# Patient Record
Sex: MALE | Race: WHITE | NOT HISPANIC OR LATINO | Employment: OTHER | URBAN - METROPOLITAN AREA
[De-identification: names, ages, dates, MRNs, and addresses within clinical notes are randomized per-mention and may not be internally consistent; named-entity substitution may affect disease eponyms.]

---

## 2017-01-10 ENCOUNTER — OFFICE VISIT (OUTPATIENT)
Dept: ALLERGY | Facility: CLINIC | Age: 57
End: 2017-01-10
Payer: COMMERCIAL

## 2017-01-10 VITALS
SYSTOLIC BLOOD PRESSURE: 150 MMHG | DIASTOLIC BLOOD PRESSURE: 90 MMHG | WEIGHT: 183.8 LBS | OXYGEN SATURATION: 97 % | BODY MASS INDEX: 27.86 KG/M2 | HEIGHT: 68 IN | HEART RATE: 83 BPM

## 2017-01-10 DIAGNOSIS — T78.3XXA ANGIOEDEMA, INITIAL ENCOUNTER: ICD-10-CM

## 2017-01-10 DIAGNOSIS — L50.9 URTICARIA: Primary | ICD-10-CM

## 2017-01-10 PROCEDURE — 99203 OFFICE O/P NEW LOW 30 MIN: CPT | Performed by: ALLERGY & IMMUNOLOGY

## 2017-01-10 NOTE — PROGRESS NOTES
"Dear Sunshine Alvarez MD    Thank you for referring your patient Isacc Louis to the Allergy/Immunology Clinic. Isacc Louis was seen in the Allergy Clinic at [facility]. The following are my recommendations regarding his Urticaria and Angioedema    1. Hold all alcohol for now  2. Begin zyrtec 20mg twice daily x 1 month. If symptoms do not develop, can slowly re-introduce alcohol while remaining on twice daily antihistamines  3. If symptoms do not return while on high-dose antihistamines and alcohol has been re-introduced into the diet, will slowly wean antihistamines  4. Follow-up in 2-3 months      Isacc Louis is a 56 year old White male being seen today in consultation for hives and facial swelling. He states that these symptoms first began last summer when [place]. He woke up with \"knots\" on his forehead and it looked as though he had bumped his head. He also had lip swelling. Isacc did not have a rash at that time. His symptoms resolved by that afternoon without any medication. He has continued to have recurrent episodes of lip swelling, \"knots\" on his forehead and now accompanied by some additional facial swelling and welts on his body. The symptoms typically start between 3 and 6AM and resolve by the following afternoon or evening. On one occasion he tried taking claritin but he couldn't tell if it was helping or if the swelling was resolving on its own. Isacc has had reactions that started during the day and he states he can feel his lip actively swelling. He has never experienced throat tightness, difficulty swallowing, difficulty breathing, coughing, shortness of breath, nausea, abdominal pain, vomiting, dizziness, or lightheadedness along with these symptoms. This past weekend was the first time he noticed tongue swelling and he had the most significant lip swelling that he had experienced. Again, his symptoms resolved within 24 hours.    Isacc has not been " able to identify any particular trigger for his symptoms other than drinking beer. All of these episodes have occurred after he was drinking beer and on one occasion red wine. 2 weeks ago he took claritin before drinking beer and the following day he did not have any swelling. The day after that he did have some rash but no swelling or other symptoms. This past weekend he did not take the claritin and did have symptoms of rash and lip swelling. He typically drinks craft beers usually only on the weekends. This past weekend he did not drink beer and only had 2 glasses of red wine.    Isacc is not taking any anti-hypertensive medications and does not take OTC medications regularly. He does use ibuprofen as needed for an old back injury. He last took this a few weeks ago and the following day had lip swelling however he had also drank beer that night. He has never had this type of reaction to ibuprofen or aleve in the past.      Past Medical History   Diagnosis Date     CARDIOVASCULAR SCREENING; LDL GOAL LESS THAN 160 2013     NO ACTIVE PROBLEMS (aka NONE)      Family History   Problem Relation Age of Onset     C.A.D. Father       from an MI at 56     Past Surgical History   Procedure Laterality Date     No history of surgery       Ent surgery       Tonsillectomy     Colonoscopy with co2 insufflation N/A 2014     Procedure: COLONOSCOPY WITH CO2 INSUFFLATION;  Surgeon: Kalpana Chen MD;  Location:  OR       ENVIRONMENTAL HISTORY: [place]. The home is heated with a forced air. They does have central air conditioning. The patient's bedroom is furnished with hard axel in bedroom.  Pets inside the house include None. There is not history of cockroach or mice infestation. There is/are 0 smokers in the house.  The house does not have a damp basement.     SOCIAL HISTORY:   Isacc is employed as  . He has missed 0 days of school/work.  "He lives with his spouse and son.  His spouse works as a .    REVIEW OF SYSTEMS:  General: positive  for weight gain. negative for weight loss. negative for changes in sleep.   Eyes: negative for itching. negative for redness. negative for tearing/watering.  Ears: negative for fullness. negative for hearing loss. negative for dizziness.   Nose: negative for snoring.negative for changes in smell. negative for drainage.   Throat: negative for hoarseness. negative for sore throat. negative for trouble swallowing.   Lungs: negative for shortness of breath.negative for wheezing. negative for sputum production.   Cardiovascular: negative for chest pain. negative for swelling of ankles. negative for fast or irregular heartbeat.   Gastrointestinal: negative for nausea. negative for heartburn. negative for acid reflux.   Musculoskeletal: negative for joint pain. negative for joint stiffness. negative for joint swelling.   Neurologic: negative for seizures. negative for fainting. negative for weakness.   Psychiatric: negative for changes in mood. negative for anxiety.   Endocrine: negative for cold intolerance. negative for heat intolerance. negative for tremors.   Hematologic: negative for easy bruising. negative for easy bleeding.  Integumentary: positive  for rash. negative for scaling. negative for nail changes.       Current outpatient prescriptions:      Ibuprofen (IBU PO), Take  by mouth., Disp: , Rfl:   Immunization History   Administered Date(s) Administered     TDAP (ADACEL AGES 11-64) 11/12/2014     No Known Allergies      EXAM:   /98 mmHg  Pulse 83  Ht 1.727 m (5' 8\")  Wt 83.371 kg (183 lb 12.8 oz)  BMI 27.95 kg/m2  SpO2 97%  GENERAL APPEARANCE: alert, cooperative and not in distress  SKIN: no rashes, no lesions  HEAD: atraumatic, normocephalic  EYES: lids and lashes normal, conjunctivae and sclerae clear, pupils equal, round, reactive to light, EOM full and intact  ENT: no scars or " lesions, nasal exam showed no discharge, swelling or lesions noted, otoscopy showed external auditory canals clear, tympanic membranes normal, tongue midline and normal, soft palate, uvula, and tonsils normal  NECK: no asymmetry, masses, or scars, supple without significant adenopathy  LUNGS: unlabored respirations, no intercostal retractions or accessory muscle use, clear to auscultation without rales or wheezes  HEART: regular rate and rhythm without murmurs and normal S1 and S2  MUSCULOSKELETAL: no musculoskeletal defects are noted  NEURO: no focal deficits noted, mental status intact  PSYCH: does not appear depressed or anxious and short and long term memory appears intact    WORKUP: None    ASSESSMENT/PLAN:  Isacc Louis is a 56 year old male here for evaluation of recurrent episodes of rash and lip swelling. He does not currently have any symptoms but did bring pictures that are consistent with urticaria and angioedema. It is unclear what provoked his initial reaction but he has consistently noted symptoms after ingestion of alcohol. Chronic urticaria can be associated with angioedema in many cases and can be exacerbated by alcohol or NSAIDs. Isacc was counseled to avoid alcohol and NSAIDs over the next month and increase his antihistamine dose. If symptoms do not occur he may try re-introducing alcohol while continuing with high dose antihistamines. If symptoms do not return he may slowly begin to wean antihistamines. Should his symptoms return we discussed pre-medication with high dose antihistamines prior to drinking vs complete avoidance of alcohol.    1. Hold all alcohol for now  2. Begin zyrtec 20mg twice daily x 1 month. If symptoms do not develop, can slowly re-introduce alcohol while remaining on twice daily antihistamines  3. If symptoms do not return while on high-dose antihistamines and alcohol has been re-introduced into the diet, will slowly wean antihistamines  4. Follow-up in 2-3  francoise Chatman MD  Allergy/Immunology  [facility] and [place], [place]      Chart documentation done in part with Dragon Voice Recognition Software. Although reviewed after completion, some word and grammatical errors may remain.

## 2017-01-10 NOTE — MR AVS SNAPSHOT
After Visit Summary   1/10/2017    Isacc Louis    MRN: [id number]           Patient Information     Date Of Birth          1960        Visit Information        Provider Department      1/10/2017 4:00 PM Saulo Chatman MD [facility]        Care Instructions    If you have any questions regarding your allergies, asthma, or what we discussed during your visit today please call the allergy clinic or contact us via [employer].      [facility] Allergy: [phone]      1. Begin taking zyrtec (generic is cetirizine) 10mg tablets - 1 table twice daily x 1 month. During this time avoid drinking alcohol    2. If your symptoms have not returned, you may slowly re-introduce alcohol into your diet but continue taking the zyrtec twice daily    3. If you continue to remain symptom free, wean the zyrtec to 1 tablet daily x 1 month and then stop.    4. If symptoms return when drinking alcohol, pre-medicate with zyrtec. Take 2 tablets (20mg) at least an hour prior to drinking alcohol and repeat another 2 tablets the following day.        Follow-ups after your visit        Who to contact     If you have questions or need follow up information about today's clinic visit or your schedule please contact [facility] directly at [phone].  Normal or non-critical lab and imaging results will be communicated to you by [employer], letter or phone within 4 business days after the clinic has received the results. If you do not hear from us within 7 days, please contact the clinic through [employer] or phone. If you have a critical or abnormal lab result, we will notify you by phone as soon as possible.  Submit refill requests through [employer] or call your pharmacy and they will forward the refill request to us. Please allow 3 business days for your refill to be completed.          Additional Information About Your Visit        [employer] Information     [employer] lets you send messages to your  "doctor, view your test results, renew your prescriptions, schedule appointments and more. To sign up, go to [email or web address] . Click on \"Log in\" on the left side of the screen, which will take you to the Welcome page. Then click on \"Sign up Now\" on the right side of the page.     You will be asked to enter the access code listed below, as well as some personal information. Please follow the directions to create your username and password.     Your access code is: [email or web address]  Expires: 3/19/2017  5:19 PM     Your access code will  in 90 days. If you need help or a new code, please call your [facility] clinic or [phone].        Care EveryWhere ID     This is your Care EveryWhere ID. This could be used by other organizations to access your [facility] medical records  [id number]        Your Vitals Were     Pulse Height BMI (Body Mass Index) Pulse Oximetry          83 1.727 m (5' 8\") 27.95 kg/m2 97%         Blood Pressure from Last 3 Encounters:   01/10/17 143/98   16 128/96   16 122/80    Weight from Last 3 Encounters:   01/10/17 83.371 kg (183 lb 12.8 oz)   16 82.872 kg (182 lb 11.2 oz)   16 83.28 kg (183 lb 9.6 oz)              Today, you had the following     No orders found for display       Primary Care Provider Office Phone #    [facility] [place] [phone]       No address on file        Thank you!     Thank you for choosing [facility] [place]  for your care. Our goal is always to provide you with excellent care. Hearing back from our patients is one way we can continue to improve our services. Please take a few minutes to complete the written survey that you may receive in the mail after your visit with us. Thank you!             Your Updated Medication List - Protect others around you: Learn how to safely use, store and throw away your medicines at www.disposemymeds.org.          This list is accurate as of: 1/10/17  4:15 PM.  Always use your most recent " med list.                   Brand Name Dispense Instructions for use    IBU PO      Take  by mouth.

## 2017-01-10 NOTE — PATIENT INSTRUCTIONS
If you have any questions regarding your allergies, asthma, or what we discussed during your visit today please call the allergy clinic or contact us via [facility].      [facility]: [phone]      1. Begin taking zyrtec (generic is cetirizine) 10mg tablets - 2 tablets twice daily x 1 month. During this time avoid drinking alcohol    2. If your symptoms have not returned, you may slowly re-introduce alcohol into your diet but continue taking the zyrtec twice daily    3. If you continue to remain symptom free, wean the zyrtec to 1 tablet twice daily x 1 month then 1 tablet daily x 1 month and then stop.    4. If symptoms return when drinking alcohol, pre-medicate with zyrtec. Take 2 tablets (20mg) at least an hour prior to drinking alcohol and repeat another 2 tablets the following day.

## 2017-01-10 NOTE — NURSING NOTE
"Chief Complaint   Patient presents with     Consult     hives, lips and face swelling since last summer2016       Initial /98 mmHg  Pulse 83  Ht 1.727 m (5' 8\")  Wt 83.371 kg (183 lb 12.8 oz)  BMI 27.95 kg/m2  SpO2 97% Estimated body mass index is 27.95 kg/(m^2) as calculated from the following:    Height as of this encounter: 1.727 m (5' 8\").    Weight as of this encounter: 83.371 kg (183 lb 12.8 oz).  BP completed using cuff size: regular  Maile Aponte MA        "

## 2018-11-27 ENCOUNTER — OFFICE VISIT (OUTPATIENT)
Dept: FAMILY MEDICINE | Facility: CLINIC | Age: 58
End: 2018-11-27
Payer: COMMERCIAL

## 2018-11-27 VITALS
HEART RATE: 75 BPM | BODY MASS INDEX: 26.52 KG/M2 | OXYGEN SATURATION: 95 % | SYSTOLIC BLOOD PRESSURE: 132 MMHG | HEIGHT: 68 IN | TEMPERATURE: 97.8 F | RESPIRATION RATE: 18 BRPM | WEIGHT: 175 LBS | DIASTOLIC BLOOD PRESSURE: 90 MMHG

## 2018-11-27 DIAGNOSIS — R05.9 COUGH: Primary | ICD-10-CM

## 2018-11-27 PROCEDURE — 99213 OFFICE O/P EST LOW 20 MIN: CPT | Performed by: NURSE PRACTITIONER

## 2018-11-27 RX ORDER — CODEINE PHOSPHATE AND GUAIFENESIN 10; 100 MG/5ML; MG/5ML
1-2 SOLUTION ORAL 2 TIMES DAILY PRN
Qty: 120 ML | Refills: 0 | Status: SHIPPED | OUTPATIENT
Start: 2018-11-27 | End: 2020-07-31

## 2018-11-27 RX ORDER — DOXYCYCLINE 100 MG/1
100 CAPSULE ORAL 2 TIMES DAILY
Qty: 14 CAPSULE | Refills: 0 | Status: SHIPPED | OUTPATIENT
Start: 2018-11-27 | End: 2020-07-31

## 2018-11-27 ASSESSMENT — PAIN SCALES - GENERAL: PAINLEVEL: NO PAIN (0)

## 2018-11-27 NOTE — PROGRESS NOTES
"  SUBJECTIVE:   Isacc Louis is a 58 year old male who presents to clinic today for the following health issues:      Follow up Cough  -3 weeks ago went [place] hunting and climbed up a lot of hills  -Hard to take a deep breath  -when laying flat can hear lungs gurgling  -Body is tired    Went on [employer]uel for work, got a prescription. Didn't seem to help. Symptoms worsening as the day goes on. Was given tessalon pills, not much help. No antibiotic. That script was few a few weeks ago. Feels a little fever/chills.         Problem list and histories reviewed & adjusted, as indicated.  Additional history: as documented    Patient Active Problem List   Diagnosis     CARDIOVASCULAR SCREENING; LDL GOAL LESS THAN 160     NO ACTIVE PROBLEMS (aka NONE)     Onychomycosis     Sleep disturbances     Elevated blood pressure reading without diagnosis of hypertension     Past Surgical History:   Procedure Laterality Date     COLONOSCOPY WITH CO2 INSUFFLATION N/A 2014    Procedure: COLONOSCOPY WITH CO2 INSUFFLATION;  Surgeon: Kalpana Chen MD;  Location: [facility] OR     ENT SURGERY      Tonsillectomy     NO HISTORY OF SURGERY         Social History   Substance Use Topics     Smoking status: Never Smoker     Smokeless tobacco: Never Used     Alcohol use Yes      Comment: 4 beers over the weekend     Family History   Problem Relation Age of Onset     C.A.D. Father       from an MI at 56           Reviewed and updated as needed this visit by clinical staff  Tobacco  Allergies  Meds  Problems  Med Hx  Surg Hx  Fam Hx  Soc Hx        Reviewed and updated as needed this visit by Provider  Allergies  Meds  Problems         ROS:  Constitutional, HEENT-as above, cardiovascular, pulmonary, gi and gu systems are negative, except as otherwise noted.    OBJECTIVE:     /90 (BP Location: Right arm, Patient Position: Sitting, Cuff Size: Adult Regular)  Pulse 75  Temp 97.8  F (36.6  C) (Oral)  Resp 18  Ht 1.727 m (5' 8\") "  Wt 79.4 kg (175 lb)  SpO2 95%  BMI 26.61 kg/m2  Body mass index is 26.61 kg/(m^2).  GENERAL: healthy, alert and no acute distress  EYES: Eyes grossly normal to inspection, PERRL and conjunctivae and sclerae normal  HENT: ear canals and TM's normal, nose and mouth without ulcers or lesions  NECK: no adenopathy, no asymmetry, masses, or scars and thyroid normal to palpation  RESP: lungs clear to auscultation - no rales, rhonchi or wheezes, dry harsh cough noted  CV: regular rate and rhythm, normal S1 S2, no S3 or S4, no murmur, click or rub  MS: no gross musculoskeletal defects noted, no edema    Diagnostic Test Results:  none     ASSESSMENT/PLAN:       1. Cough  Trial antibiotic. Call if not improving in 4-5 days  - doxycycline hyclate (VIBRAMYCIN) 100 MG capsule; Take 1 capsule (100 mg) by mouth 2 times daily  Dispense: 14 capsule; Refill: 0  - guaiFENesin-codeine (ROBITUSSIN AC) 100-10 MG/5ML SOLN solution; Take 5-10 mLs by mouth 2 times daily as needed for cough  Dispense: 120 mL; Refill: 0    FUTURE APPOINTMENTS:       - Follow-up visit in prn    NIDA Barahona, NP-C  [facility]

## 2018-11-27 NOTE — MR AVS SNAPSHOT
"              After Visit Summary   2018    Isacc Louis    MRN: [id number]           Patient Information     Date Of Birth          1960        Visit Information        Provider Department      2018 4:40 PM Marnie Osborne NP [facility]        Today's Diagnoses     Cough    -  1       Follow-ups after your visit        Follow-up notes from your care team     Return in about 7 days (around 2018), or if symptoms worsen or fail to improve.      Who to contact     If you have questions or need follow up information about today's clinic visit or your schedule please contact [facility] directly at [phone].  Normal or non-critical lab and imaging results will be communicated to you by MyChart, letter or phone within 4 business days after the clinic has received the results. If you do not hear from us within 7 days, please contact the clinic through MyChart or phone. If you have a critical or abnormal lab result, we will notify you by phone as soon as possible.  Submit refill requests through [employer] or call your pharmacy and they will forward the refill request to us. Please allow 3 business days for your refill to be completed.          Additional Information About Your Visit        MyChart Information     [employer] lets you send messages to your doctor, view your test results, renew your prescriptions, schedule appointments and more. To sign up, go to [email or web address] . Click on \"Log in\" on the left side of the screen, which will take you to the Welcome page. Then click on \"Sign up Now\" on the right side of the page.     You will be asked to enter the access code listed below, as well as some personal information. Please follow the directions to create your username and password.     Your access code is: [email or web address]  Expires: 2019  5:35 PM     Your access code will  in 90 days. If you need help or a new code, please call your [facility] " "or [phone].        Care EveryWhere ID     This is your Care EveryWhere ID. This could be used by other organizations to access your [facility] medical records  [id number]        Your Vitals Were     Pulse Temperature Respirations Height Pulse Oximetry BMI (Body Mass Index)    75 97.8  F (36.6  C) (Oral) 18 1.727 m (5' 8\") 95% 26.61 kg/m2       Blood Pressure from Last 3 Encounters:   11/27/18 132/90   01/10/17 150/90   12/19/16 (!) 128/96    Weight from Last 3 Encounters:   11/27/18 79.4 kg (175 lb)   01/10/17 83.4 kg (183 lb 12.8 oz)   12/19/16 82.9 kg (182 lb 11.2 oz)              Today, you had the following     No orders found for display         Today's Medication Changes          These changes are accurate as of 11/27/18  5:35 PM.  If you have any questions, ask your nurse or doctor.               Start taking these medicines.        Dose/Directions    doxycycline hyclate 100 MG capsule   Commonly known as:  VIBRAMYCIN   Used for:  Cough   Started by:  Marnie Osborne NP        Dose:  100 mg   Take 1 capsule (100 mg) by mouth 2 times daily   Quantity:  14 capsule   Refills:  0       guaiFENesin-codeine 100-10 MG/5ML Soln solution   Commonly known as:  ROBITUSSIN AC   Used for:  Cough   Started by:  Marnie Osborne NP        Dose:  1-2 tsp.   Take 5-10 mLs by mouth 2 times daily as needed for cough   Quantity:  120 mL   Refills:  0            Where to get your medicines      These medications were sent to [id number] - [place] [id number] [place] [address]  [address] [place] [id number]     Phone:  [phone]     doxycycline hyclate 100 MG capsule         Some of these will need a paper prescription and others can be bought over the counter.  Ask your nurse if you have questions.     Bring a paper prescription for each of these medications     guaiFENesin-codeine 100-10 MG/5ML Soln solution                Primary Care Provider Office Phone # Fax #    [facility] [phone] " [phone]       [address] [facility] [address]        Equal Access to Services     YARELY TEJADA : Hadii aad ku hadsunday Soharish, waaxda luqadaha, qaybta kaalmada yeimy, peri stephaniein hayaatyron mccoyisaiah singh jenifer ramirez. [facility] [phone].    ATENCIÓN: Si habla español, tiene a armando disposición servicios gratuitos de asistencia lingüística. Llame al [phone].    We comply with applicable federal civil rights laws and [place] laws. We do not discriminate on the basis of race, color, national origin, age, disability, sex, sexual orientation, or gender identity.            Thank you!     Thank you for choosing [facility]  for your care. Our goal is always to provide you with excellent care. Hearing back from our patients is one way we can continue to improve our services. Please take a few minutes to complete the written survey that you may receive in the mail after your visit with us. Thank you!             Your Updated Medication List - Protect others around you: Learn how to safely use, store and throw away your medicines at www.disposemymeds.org.          This list is accurate as of 11/27/18  5:35 PM.  Always use your most recent med list.                   Brand Name Dispense Instructions for use Diagnosis    doxycycline hyclate 100 MG capsule    VIBRAMYCIN    14 capsule    Take 1 capsule (100 mg) by mouth 2 times daily    Cough       guaiFENesin-codeine 100-10 MG/5ML Soln solution    ROBITUSSIN AC    120 mL    Take 5-10 mLs by mouth 2 times daily as needed for cough    Cough

## 2019-11-15 ENCOUNTER — OFFICE VISIT (OUTPATIENT)
Dept: FAMILY MEDICINE | Facility: CLINIC | Age: 59
End: 2019-11-15
Payer: COMMERCIAL

## 2019-11-15 VITALS — DIASTOLIC BLOOD PRESSURE: 94 MMHG | SYSTOLIC BLOOD PRESSURE: 138 MMHG

## 2019-11-15 DIAGNOSIS — M54.12 CERVICAL RADICULOPATHY: ICD-10-CM

## 2019-11-15 DIAGNOSIS — M54.9 UPPER BACK PAIN ON RIGHT SIDE: ICD-10-CM

## 2019-11-15 DIAGNOSIS — S46.811A SUPRASPINATUS SPRAIN, RIGHT, INITIAL ENCOUNTER: Primary | ICD-10-CM

## 2019-11-15 DIAGNOSIS — R03.0 ELEVATED BLOOD PRESSURE READING WITHOUT DIAGNOSIS OF HYPERTENSION: ICD-10-CM

## 2019-11-15 PROCEDURE — 99214 OFFICE O/P EST MOD 30 MIN: CPT | Performed by: NURSE PRACTITIONER

## 2019-11-15 RX ORDER — CYCLOBENZAPRINE HCL 10 MG
10 TABLET ORAL 3 TIMES DAILY PRN
Qty: 30 TABLET | Refills: 1 | Status: SHIPPED | OUTPATIENT
Start: 2019-11-15 | End: 2020-07-31

## 2019-11-15 ASSESSMENT — MIFFLIN-ST. JEOR: SCORE: 1471.04

## 2019-11-15 ASSESSMENT — PAIN SCALES - GENERAL: PAINLEVEL: EXTREME PAIN (8)

## 2019-11-15 NOTE — PROGRESS NOTES
Subjective     Isacc Louis is a 59 year old male who presents to clinic today for the following health issues:    HPI   Neck Pain  Onset: 5 days ago     Description:   Location: neck   Radiation: into the left shoulder    Intensity: 8/10    Progression of Symptoms:  worsening and same    Accompanying Signs & Symptoms:  Burning, prickly sensation (paresthesias) in arm(s): no   Numbness in arm(s): YES-left  Weakness in arm(s):  YES-left  Fever: no   Headache: no   Nausea and/or vomiting: no     History:   Trauma: no   Previous neck pain: YES  Previous surgery or injections: no   Previous Imaging (MRI,X ray): no     Precipitating factors:   Does movement increase the pain:  no     Alleviating factors:  Nothing     Therapies Tried and outcome:  Heat pads, ice  And ibuprofen didn't help   Patient was deer hunting last week but was more active than usual.  He has pain left supraspinatus region that radiates to his fingers, has numbness/tingling/weakness of 4th and 5th fingers on the left. No falls or prior injuries, no fever or chills. Patient denies: saddle paresthesia, leg wkness, fever, wt loss, urinary symptoms, cp, shortness of breath, other red flags.       Patient Active Problem List   Diagnosis     CARDIOVASCULAR SCREENING; LDL GOAL LESS THAN 160     NO ACTIVE PROBLEMS (aka NONE)     Onychomycosis     Sleep disturbances     Elevated blood pressure reading without diagnosis of hypertension     Past Surgical History:   Procedure Laterality Date     COLONOSCOPY WITH CO2 INSUFFLATION N/A 12/19/2014    Procedure: COLONOSCOPY WITH CO2 INSUFFLATION;  Surgeon: Kalpana Chen MD;  Location: [facility]     ENT SURGERY      Tonsillectomy     NO HISTORY OF SURGERY         Social History     Tobacco Use     Smoking status: Never Smoker     Smokeless tobacco: Never Used   Substance Use Topics     Alcohol use: Yes     Comment: 4 beers over the weekend     Family History   Problem Relation Age of Onset     C.A.D. Father       "    from an MI at 56         Current Outpatient Medications   Medication Sig Dispense Refill     cyclobenzaprine (FLEXERIL) 10 MG tablet Take 1 tablet (10 mg) by mouth 3 times daily as needed for muscle spasms 30 tablet 1     doxycycline hyclate (VIBRAMYCIN) 100 MG capsule Take 1 capsule (100 mg) by mouth 2 times daily (Patient not taking: Reported on 11/15/2019) 14 capsule 0     guaiFENesin-codeine (ROBITUSSIN AC) 100-10 MG/5ML SOLN solution Take 5-10 mLs by mouth 2 times daily as needed for cough (Patient not taking: Reported on 11/15/2019) 120 mL 0     BP Readings from Last 3 Encounters:   11/15/19 (!) 138/94   18 132/90   01/10/17 150/90    Wt Readings from Last 3 Encounters:   11/15/19 (P) 66.9 kg (147 lb 6.4 oz)   18 79.4 kg (175 lb)   01/10/17 83.4 kg (183 lb 12.8 oz)         Reviewed and updated as needed this visit by Provider         Review of Systems   ROS COMP: Constitutional, HEENT, cardiovascular, pulmonary, gi and gu systems are negative, except as otherwise noted.      Objective    BP (!) 138/94 (BP Location: Left arm, Patient Position: Chair, Cuff Size: Adult Regular)   Pulse (P) 68   Temp (P) 98.3  F (36.8  C) (Oral)   Resp (P) 16   Ht (P) 1.74 m (5' 8.5\")   Wt (P) 66.9 kg (147 lb 6.4 oz)   SpO2 (P) 98%   BMI (P) 22.09 kg/m    Body mass index is 22.09 kg/m  (pended).  Physical Exam   GENERAL: healthy, alert and no distress  EYES: Eyes grossly normal to inspection, PERRL and conjunctivae and sclerae normal  HENT: ear canals and TM's normal, nose and mouth without ulcers or lesions  NECK: no adenopathy, no asymmetry, masses, or scars and thyroid normal to palpation  RESP: lungs clear to auscultation - no rales, rhonchi or wheezes  CV: regular rate and rhythm, normal S1 S2, no S3 or S4, no murmur, click or rub, no peripheral edema and peripheral pulses strong  ABDOMEN: soft, nontender, no hepatosplenomegaly, no masses and bowel sounds normal  MS:TTP along right supraspinatus, " upper trapezius, able to raise arms above shoulder level but with pain, no weakness, decreased sensation lateral forearm and 4th and 5th fingers on right, otherwise,  no gross musculoskeletal defects noted, no edema  SKIN: no suspicious lesions or rashes  NEURO: Normal strength and tone, sensory exam grossly normal, mentation intact, oriented times 3, cranial nerves 2-12 intact, DTR's normal and symmetric UE and gait normal including heel/toe/tandem walking  PSYCH: mentation appears normal, affect normal/bright  LYMPH: normal ant/post cervical, supraclavicular nodes    Diagnostic Test Results:  Labs reviewed in Epic  none         Assessment & Plan     1. Supraspinatus sprain, right, initial encounter  Referring to physical therapy, patient to begin taking 800 mg Ibuprofen and Flexeril TID for pain/muscle spasm, reviewed indications for return to clinic/ visit, follow back 4 weeks.  - cyclobenzaprine (FLEXERIL) 10 MG tablet; Take 1 tablet (10 mg) by mouth 3 times daily as needed for muscle spasms  Dispense: 30 tablet; Refill: 1  - [facility]; Future    2. Upper back pain on right side  Likely overuse injury, see above.  - [facility]; Future    3. Elevated blood pressure reading without diagnosis of hypertension  Patient currently in pain, recheck BP 4 weeks. Low salt diet, encouraged.    4.  Cervical radiculopathy  Referring to physical therapy, follow back if not improved            See Patient Instructions    Return in about 4 weeks (around 12/13/2019), or if symptoms worsen or fail to improve.    NIDA Braxton [facility]

## 2019-11-15 NOTE — PATIENT INSTRUCTIONS
Patient Education     Treating Strains and Sprains    Strains and sprains happen when muscles or other soft tissues near your bones stretch or tear. These injuries can cause bruising, swelling, and pain. To ease your discomfort and speed the healing of your strain or sprain, follow the tips below. Remember, a strain or sprain can take 6 to 8 weeks to heal.  Important Note: Do not give aspirin to children or teens without discussing it with your healthcare provider first.  Ice first, heat later    Use ice for the first 24 to 48 hours after injury. Ice helps prevent swelling and reduce pain. Ice the injury for no more than 20 minutes at a time and allow at least 20 minutes between icing sessions.    Apply heat after the first 72 hours, once the swelling has gone down. Heat relaxes muscles and increases blood flow. Soak the injured area in warm water or use a heating pad set on low for no more than 15 minutes at a time.  Wrap and elevate    Wrap an injured limb firmly with an elastic bandage. This provides support and helps prevent swelling. Don t wear an elastic bandage overnight. Watch for tingling, numbness, or increased pain. Remove the bandage immediately if any of these occurs.    Elevate the injured area to help reduce swelling and throbbing. It s best to raise an injured limb above the level of your heart.  Medicines    Over-the-counter medicines such as acetaminophen or ibuprofen can help reduce pain. Some also help reduce swelling.    Take medicine only as directed.    Rest the area even if medicines are controlling the pain.  Rest    Rest the injured area by not using it for 24 hours.    When you re ready, return slowly to your normal activities. Rest the injured area often.    Don t use or walk on an injured limb if it hurts.  Date Last Reviewed: 1/1/2018 2000-2018 [employer]. [address] [facility], [place] [address]. All rights reserved. This information is not intended as a substitute  for professional medical care. Always follow your healthcare professional's instructions.

## 2019-11-20 ENCOUNTER — TELEPHONE (OUTPATIENT)
Dept: FAMILY MEDICINE | Facility: CLINIC | Age: 59
End: 2019-11-20

## 2019-11-20 DIAGNOSIS — S46.812D STRAIN OF SUPRASPINATUS MUSCLE OR TENDON, LEFT, SUBSEQUENT ENCOUNTER: Primary | ICD-10-CM

## 2019-11-20 NOTE — TELEPHONE ENCOUNTER
Spoke with patient on the phone. He has been taking cyclobenzaprine (FLEXERIL) 10 MG tablet for the neck pain that was prescribed for him at the office visit on  11/15/19. He takes it as prescribed. It helps with the pain somewhat but not much. He experiences  sharp pain intermittently and intermittently dull pain throughout the day.  Laying in bed sleeping pain is at its least. After he starts moving around pain returns, pain radiates into  back neck,  into arms, arms tingly .    Pain at its worst is 8/10.     patient is also taking aleve Q 12 hours.     Patient is wondering if provider can prescribe him something else to help with the pain in his  neck?    Olamide Amaro RN    Patient uses [employer].

## 2019-11-20 NOTE — TELEPHONE ENCOUNTER
Reason for call:  Other   Patient called regarding (reason for call): call back  Additional comments: the muscle relaxer from last week isn't helping much  He would like to call about a plan B      Phone number to reach patient:  Home number on file [phone] (home)    Best Time:  any    Can we leave a detailed message on this number?  YES     Use [facility]

## 2019-11-21 RX ORDER — TRAMADOL HYDROCHLORIDE 50 MG/1
50 TABLET ORAL EVERY 6 HOURS PRN
Qty: 12 TABLET | Refills: 0 | Status: SHIPPED | OUTPATIENT
Start: 2019-11-21 | End: 2019-11-24

## 2019-11-22 ENCOUNTER — THERAPY VISIT (OUTPATIENT)
Dept: PHYSICAL THERAPY | Facility: CLINIC | Age: 59
End: 2019-11-22
Payer: COMMERCIAL

## 2019-11-22 DIAGNOSIS — M54.12 CERVICAL RADICULOPATHY: ICD-10-CM

## 2019-11-22 PROCEDURE — 97110 THERAPEUTIC EXERCISES: CPT | Mod: GP | Performed by: PHYSICAL THERAPIST

## 2019-11-22 PROCEDURE — 97161 PT EVAL LOW COMPLEX 20 MIN: CPT | Mod: GP | Performed by: PHYSICAL THERAPIST

## 2019-11-22 NOTE — TELEPHONE ENCOUNTER
Spoke with patient about his pain which continues despite Ibuprofen 800 mg every 8 hours and  Flexeril 10 mg TID.  Pain radiates from upper left shoulder to elbow and he has tingling in 4th and 5th fingers.  Advised him to schedule back and we'll get X ray.  For now, gave #12 Tramadol for pain and he can continue on Ibuprofen and Flexeril for now.  Cloleen ALVA, CNP

## 2019-11-22 NOTE — PROGRESS NOTES
"[facility] Initial Evaluation -- Cervical    Evaluation Date: November 22, 2019  Isacc Louis is a 59 year old male with a cervica/shoulder condition.   Referral: primary care  Work mechanical stresses: sitting   Employment status:   Leisure mechanical stresses: n/a  Functional disability score (NDI):  See JUDE  VAS score (0-10): 3-8/10  Patient goals/expectations:  Decrease pain    HISTORY:    Present symptoms:  L neck/upper back pain, pain into L upper arm to elbow, L arm weakness.  Pain quality (sharp/shooting/stabbing/aching/burning/cramping):   Sharp, achey.  Paresthesia (yes/no):  Tingling in 4th/5th most, others as well    Present since (onset date): two weeks (early Nov 2019).     Symptoms (improving/unchanging/worsening):  unchanged.    Symptoms commenced as a result of: not sure, but was in tree stand bow hunting for a week just prior   Condition occurred in the following environment:  outdoors    Symptoms at onset (neck/arm/forearm/headache): L neck  Constant symptoms (neck/arm/forearm/headache): L neck/upper arm  Intermittent symptoms (neck/arm/forearm/headache): tingling    Symptoms are made worse with the following: Sometimes Sitting and time of day - Always AM   Symptoms are made better with the following: pain meds    Disturbed sleep (yes/no): 2-3 hrs lost per night  Number of pillows: 1  Sleeping postures (prone/sup/side R/L):  Either side    Previous episodes (0/1-5/6-10/11+): none Year of first episode: n/a    Previous history: describes \"pinched nerve\" that resolves on its own after a few days (several times)  Previous treatments: none    Specific Questions: (as reported by the patient)  Dizziness/Tinnitus/Nausea/Swallowing (pos/neg): neg  Gait/Upper Limbs (normal/abnormal): normal  Medications (nil/NSAIDS/anlag/steroids/anticoag/other):  Narcotics/Opiods and Muscle relaxants  Medical allergies:  none  General health (excellent/good/fair/poor):  " excellent  Pertinent medical history:  None  Imaging (None/Xray/MRI/Other):  none  Recent or major surgery (yes/no): no  Night pain (yes/no): no  Accidents (yes/no): none  Unexplained weight loss (yes/no): no  Barriers at home: none  Other red flags: none      HPI                    Objective:  System              Cervical/Thoracic Evaluation      Cervical Myotomes:          C5 (Deltoid):  Left: 5    Right: 5  C6 (Biceps):  Left: 4+    Right: 5  C7 (Triceps):  Left: 4+    Right: 5  C8 (Thumb Ext): Left: 3+    Right: 5  T1 (Intrinsics): Left: 3+    Right: 5  DTR's:  not assessed                                                              Soso Cervical Evaluation    Posture:  Sitting: fair            Movement Loss:    Flexion (Flex): nil  Retraction (RET): min  Extension (EXT): min  Lateral Flexion Right (LF R): nil  Lateral Flexion Left (LF L): mod and pain    Rotation Left (ROT L): min and pain  Test Movements:      RET: During: no effect  After: no effect  Mechanical Response: no effect  Repeat RET: During: increases  After: no worse  Mechanical Response: IncROM      RET (Lying): During: no effect  After: no effect  Mechanical Response: no effect  Repeat RET (Lying): During: decreases  After: better  Mechanical Response: IncROM                    Conclusion: derangement  Principle of Treatment:  Posture Correction: lumbar support    Extension: supine retraction 10-15 reps every 2-3 hrs                                             ROS    Assessment/Plan:    Patient is a 59 year old male with cervical complaints.    Patient has the following significant findings with corresponding treatment plan.                Diagnosis 1:  L cervical above elbow derangement  Pain -  manual therapy, self management, education, directional preference exercise and home program  Decreased ROM/flexibility - manual therapy, therapeutic exercise, therapeutic activity and home program  Decreased strength - therapeutic exercise,  therapeutic activities and home program  Inflammation - self management/home program  Impaired muscle performance - neuro re-education and home program  Decreased function - therapeutic activities and home program  Impaired posture - neuro re-education, therapeutic activities and home program    Cumulative Therapy Evaluation is: Low complexity.    Previous and current functional limitations:  (See Goal Flow Sheet for this information)    Short term and Long term goals: (See Goal Flow Sheet for this information)     Communication ability:  Patient appears to be able to clearly communicate and understand verbal and written communication and follow directions correctly.  Treatment Explanation - The following has been discussed with the patient:   RX ordered/plan of care  Anticipated outcomes  Possible risks and side effects  This patient would benefit from PT intervention to resume normal activities.   Rehab potential is excellent.    Frequency:  2 X week, once daily  Duration:  for 4 weeks  Discharge Plan:  Achieve all LTG.  Independent in home treatment program.  Reach maximal therapeutic benefit.    Please refer to the daily flowsheet for treatment today, total treatment time and time spent performing 1:1 timed codes.

## 2019-11-26 ENCOUNTER — THERAPY VISIT (OUTPATIENT)
Dept: PHYSICAL THERAPY | Facility: CLINIC | Age: 59
End: 2019-11-26
Payer: COMMERCIAL

## 2019-11-26 DIAGNOSIS — M54.12 CERVICAL RADICULOPATHY: ICD-10-CM

## 2019-11-26 PROCEDURE — 97530 THERAPEUTIC ACTIVITIES: CPT | Mod: GP | Performed by: PHYSICAL THERAPIST

## 2019-11-26 PROCEDURE — 97110 THERAPEUTIC EXERCISES: CPT | Mod: GP | Performed by: PHYSICAL THERAPIST

## 2019-12-03 ENCOUNTER — THERAPY VISIT (OUTPATIENT)
Dept: PHYSICAL THERAPY | Facility: CLINIC | Age: 59
End: 2019-12-03
Payer: COMMERCIAL

## 2019-12-03 DIAGNOSIS — M54.12 CERVICAL RADICULOPATHY: ICD-10-CM

## 2019-12-03 PROCEDURE — 97110 THERAPEUTIC EXERCISES: CPT | Mod: GP | Performed by: PHYSICAL THERAPIST

## 2019-12-03 PROCEDURE — 97530 THERAPEUTIC ACTIVITIES: CPT | Mod: GP | Performed by: PHYSICAL THERAPIST

## 2019-12-03 NOTE — PROGRESS NOTES
Subjective:  HPI                    Objective:  System    Physical Exam    General     ROS    Assessment/Plan:    SUBJECTIVE  Subjective changes as noted by pt:  Edward reports that over the weekend he was feeling good. Times where he felt no pain. Arm was still weak and numnbess in fingers. Last two days it's back again but not as bad as initially. Performing supine chin tucks. Overall 40% improved (would have said 60% this weekend).       Current pain level: 2/10     Changes in function:  Yes (See Goal flowsheet attached for changes in current functional level)     Adverse reaction to treatment or activity:  None    OBJECTIVE  Changes in objective findings:  Yes, See physical exam section and/or daily flowsheet for response to repeated movements.             ASSESSMENT  Isacc continues to require intervention to meet STG and LTG's: PT  Patient is progressing as expected.  Response to therapy has shown an improvement in  pain level and function  Progress made towards STG/LTG?  Yes (See Goal flowsheet attached for updates on achievement of STG and LTG)    PLAN  Continue current treatment plan until patient demonstrates readiness to progress to higher level exercises.    PTA/ATC plan:  N/A    Please refer to the daily flowsheet for treatment today, total treatment time and time spent performing 1:1 timed codes.

## 2019-12-06 ENCOUNTER — THERAPY VISIT (OUTPATIENT)
Dept: PHYSICAL THERAPY | Facility: CLINIC | Age: 59
End: 2019-12-06
Payer: COMMERCIAL

## 2019-12-06 DIAGNOSIS — M54.12 CERVICAL RADICULOPATHY: ICD-10-CM

## 2019-12-06 PROCEDURE — 97110 THERAPEUTIC EXERCISES: CPT | Mod: GP | Performed by: PHYSICAL THERAPIST

## 2019-12-06 PROCEDURE — 97530 THERAPEUTIC ACTIVITIES: CPT | Mod: GP | Performed by: PHYSICAL THERAPIST

## 2019-12-08 ENCOUNTER — MYC MEDICAL ADVICE (OUTPATIENT)
Dept: FAMILY MEDICINE | Facility: CLINIC | Age: 59
End: 2019-12-08

## 2019-12-09 NOTE — TELEPHONE ENCOUNTER
Sent evisit needed My Chart message sent.  Violeta Mendoza MA  [facility]  2nd Floor  Primary Care

## 2019-12-10 ENCOUNTER — THERAPY VISIT (OUTPATIENT)
Dept: PHYSICAL THERAPY | Facility: CLINIC | Age: 59
End: 2019-12-10
Payer: COMMERCIAL

## 2019-12-10 DIAGNOSIS — M54.12 CERVICAL RADICULOPATHY: ICD-10-CM

## 2019-12-10 PROCEDURE — 97110 THERAPEUTIC EXERCISES: CPT | Mod: GP | Performed by: PHYSICAL THERAPIST

## 2019-12-10 NOTE — PROGRESS NOTES
Subjective:  HPI                    Objective:  System              Cervical/Thoracic Evaluation      Cervical Myotomes:          C5 (Deltoid):  Left: 5    Right: 5  C6 (Biceps):  Left: 5    Right: 5  C7 (Triceps):  Left: 5    Right: 5  C8 (Thumb Ext): Left: 3    Right: 5  T1 (Intrinsics): Left: 3    Right: 5      Cervical Dermatomes:                  C8 left:  Hypo-light touch                                                           General     ROS    Assessment/Plan:    SUBJECTIVE  Subjective changes as noted by pt:  Edward reports that he's performing the L cervical sidebending followed by cervical retraction 15 reps every 2 hrs. Overall better. Still in pain, but it doesn't last as long. Better in morning. Yesterday was really good.        Current pain level: 1/10     Changes in function:  Yes (See Goal flowsheet attached for changes in current functional level)     Adverse reaction to treatment or activity:  None    OBJECTIVE  Changes in objective findings:  Yes, See physical exam section and/or daily flowsheet for response to repeated movements.           ASSESSMENT  Isacc continues to require intervention to meet STG and LTG's: PT  Patient's symptoms are resolving.  Patient is progressing as expected.  Response to therapy has shown an improvement in  pain level, radicular symptoms, ROM  and function  Progress made towards STG/LTG?  Yes (See Goal flowsheet attached for updates on achievement of STG and LTG)    PLAN  Continue current treatment plan until patient demonstrates readiness to progress to higher level exercises.    PTA/ATC plan:  N/A    Please refer to the daily flowsheet for treatment today, total treatment time and time spent performing 1:1 timed codes.

## 2019-12-11 ENCOUNTER — MYC MEDICAL ADVICE (OUTPATIENT)
Dept: FAMILY MEDICINE | Facility: CLINIC | Age: 59
End: 2019-12-11

## 2019-12-13 ENCOUNTER — E-VISIT (OUTPATIENT)
Dept: FAMILY MEDICINE | Facility: CLINIC | Age: 59
End: 2019-12-13

## 2019-12-13 DIAGNOSIS — M54.12 CERVICAL RADICULOPATHY: Primary | ICD-10-CM

## 2019-12-13 PROCEDURE — 99444 ZZC PHYSICIAN ONLINE EVALUATION & MANAGEMENT SERVICE: CPT | Performed by: NURSE PRACTITIONER

## 2019-12-17 ENCOUNTER — THERAPY VISIT (OUTPATIENT)
Dept: PHYSICAL THERAPY | Facility: CLINIC | Age: 59
End: 2019-12-17
Payer: COMMERCIAL

## 2019-12-17 DIAGNOSIS — M54.12 CERVICAL RADICULOPATHY: ICD-10-CM

## 2019-12-17 PROCEDURE — 97530 THERAPEUTIC ACTIVITIES: CPT | Mod: GP | Performed by: PHYSICAL THERAPIST

## 2019-12-17 PROCEDURE — 97110 THERAPEUTIC EXERCISES: CPT | Mod: GP | Performed by: PHYSICAL THERAPIST

## 2019-12-17 NOTE — PROGRESS NOTES
Subjective:  HPI                    Objective:  System    Physical Exam    General     ROS    Assessment/Plan:    SUBJECTIVE  Subjective changes as noted by pt:  Switched to Alleve from the Ibuprofen and over the weekend feels it has been better. Performing the seated sidebending every couple hours. Overall 70% to where he wants to be.         Current pain level: 1/10     Changes in function:  Yes (See Goal flowsheet attached for changes in current functional level)     Adverse reaction to treatment or activity:  None    OBJECTIVE  Changes in objective findings:  Yes, See physical exam section and/or daily flowsheet for response to repeated movements.           ASSESSMENT  Isacc continues to require intervention to meet STG and LTG's: PT  Patient's symptoms are resolving.  Patient is progressing as expected.  Response to therapy has shown an improvement in  pain level, radicular symptoms and ROM   Progress made towards STG/LTG?  Yes (See Goal flowsheet attached for updates on achievement of STG and LTG)    PLAN  Continue current treatment plan until patient demonstrates readiness to progress to higher level exercises.    PTA/ATC plan:  N/A    Please refer to the daily flowsheet for treatment today, total treatment time and time spent performing 1:1 timed codes.

## 2019-12-23 ENCOUNTER — THERAPY VISIT (OUTPATIENT)
Dept: PHYSICAL THERAPY | Facility: CLINIC | Age: 59
End: 2019-12-23
Payer: COMMERCIAL

## 2019-12-23 DIAGNOSIS — M54.12 CERVICAL RADICULOPATHY: ICD-10-CM

## 2019-12-23 PROCEDURE — 97110 THERAPEUTIC EXERCISES: CPT | Mod: GP | Performed by: PHYSICAL THERAPIST

## 2019-12-23 PROCEDURE — 97530 THERAPEUTIC ACTIVITIES: CPT | Mod: GP | Performed by: PHYSICAL THERAPIST

## 2019-12-23 NOTE — PROGRESS NOTES
Subjective:  HPI                    Objective:  System    Physical Exam    General     ROS    Assessment/Plan:    SUBJECTIVE  Subjective changes as noted by pt:  Doing better. Taking more Alleve per MD and that helps. Performing the chin tucks with overpressure every couple hours. The numnbess in his fingers is now tingling.         Current pain level: 1/10     Changes in function:  Yes (See Goal flowsheet attached for changes in current functional level)     Adverse reaction to treatment or activity:  None    OBJECTIVE  Changes in objective findings:  Yes, See physical exam section and/or daily flowsheet for response to repeated movements.           ASSESSMENT  Isacc continues to require intervention to meet STG and LTG's: PT  Patient is progressing as expected.  Response to therapy has shown an improvement in  pain level and radicular symptoms  Progress made towards STG/LTG?  Yes (See Goal flowsheet attached for updates on achievement of STG and LTG)    PLAN  Current treatment program is being advanced to more complex exercises.    PTA/ATC plan:  N/A    Please refer to the daily flowsheet for treatment today, total treatment time and time spent performing 1:1 timed codes.

## 2019-12-26 RX ORDER — CYCLOBENZAPRINE HCL 10 MG
10 TABLET ORAL
Qty: 30 TABLET | Refills: 1 | Status: SHIPPED | OUTPATIENT
Start: 2019-12-26 | End: 2020-07-31

## 2019-12-31 ENCOUNTER — THERAPY VISIT (OUTPATIENT)
Dept: PHYSICAL THERAPY | Facility: CLINIC | Age: 59
End: 2019-12-31
Payer: COMMERCIAL

## 2019-12-31 DIAGNOSIS — M54.12 CERVICAL RADICULOPATHY: ICD-10-CM

## 2019-12-31 PROCEDURE — 97110 THERAPEUTIC EXERCISES: CPT | Mod: GP | Performed by: PHYSICAL THERAPIST

## 2019-12-31 NOTE — PROGRESS NOTES
Subjective:  HPI                    Objective:  System    Physical Exam    General     ROS    Assessment/Plan:    SUBJECTIVE  Subjective changes as noted by pt:  Edward reports that he has improved since last week. L elbow pain persists, no shoulder pain. Continues to note tingling and numbness in L hand. Overall feels about 80% better in regard to pain.        Current pain level: 1-2/10     Changes in function:  Yes (See Goal flowsheet attached for changes in current functional level)     Adverse reaction to treatment or activity:  None    OBJECTIVE  Changes in objective findings:  Yes, See physical exam section and/or daily flowsheet for response to repeated movements.           ASSESSMENT  Isacc continues to require intervention to meet STG and LTG's: PT  Patient's symptoms are resolving.  Patient is progressing as expected.  Response to therapy has shown an improvement in  pain level, ROM  and function  Progress made towards STG/LTG?  Yes (See Goal flowsheet attached for updates on achievement of STG and LTG)    PLAN  Continue current treatment plan until patient demonstrates readiness to progress to higher level exercises.    PTA/ATC plan:  N/A    Please refer to the daily flowsheet for treatment today, total treatment time and time spent performing 1:1 timed codes.

## 2020-01-07 ENCOUNTER — THERAPY VISIT (OUTPATIENT)
Dept: PHYSICAL THERAPY | Facility: CLINIC | Age: 60
End: 2020-01-07
Payer: COMMERCIAL

## 2020-01-07 DIAGNOSIS — M54.12 CERVICAL RADICULOPATHY: ICD-10-CM

## 2020-01-07 PROCEDURE — 97140 MANUAL THERAPY 1/> REGIONS: CPT | Mod: GP | Performed by: PHYSICAL THERAPIST

## 2020-01-07 PROCEDURE — 97110 THERAPEUTIC EXERCISES: CPT | Mod: GP | Performed by: PHYSICAL THERAPIST

## 2020-01-07 NOTE — PROGRESS NOTES
Subjective:  HPI                    Objective:  System    Physical Exam    General     ROS    Assessment/Plan:    SUBJECTIVE  Subjective changes as noted by pt:  Edward reports that since last week he is performing the seated sidebending with self overpressure. It felt like it was plateauing so tried the cervical extension and felt like it was aggravated.  Today feels some L arm weakness and numbness is the L fingers.        Current pain level: 2/10     Changes in function:  Yes (See Goal flowsheet attached for changes in current functional level)     Adverse reaction to treatment or activity:  None    OBJECTIVE  Changes in objective findings:  Yes, See physical exam section and/or daily flowsheet for response to repeated movements.           ASSESSMENT  Isacc continues to require intervention to meet STG and LTG's: PT  Patient is progressing as expected.  Response to therapy has shown an improvement in  pain level and radicular symptoms  Progress made towards STG/LTG?  Yes (See Goal flowsheet attached for updates on achievement of STG and LTG)    PLAN  Continue current treatment plan until patient demonstrates readiness to progress to higher level exercises.    PTA/ATC plan:  N/A    Please refer to the daily flowsheet for treatment today, total treatment time and time spent performing 1:1 timed codes.

## 2020-01-14 ENCOUNTER — THERAPY VISIT (OUTPATIENT)
Dept: PHYSICAL THERAPY | Facility: CLINIC | Age: 60
End: 2020-01-14
Payer: COMMERCIAL

## 2020-01-14 DIAGNOSIS — M54.12 CERVICAL RADICULOPATHY: ICD-10-CM

## 2020-01-14 PROCEDURE — 97530 THERAPEUTIC ACTIVITIES: CPT | Mod: GP | Performed by: PHYSICAL THERAPIST

## 2020-01-14 PROCEDURE — 97110 THERAPEUTIC EXERCISES: CPT | Mod: GP | Performed by: PHYSICAL THERAPIST

## 2020-01-14 PROCEDURE — 97140 MANUAL THERAPY 1/> REGIONS: CPT | Mod: GP | Performed by: PHYSICAL THERAPIST

## 2020-01-14 NOTE — PROGRESS NOTES
Subjective:  HPI                    Objective:  System              Cervical/Thoracic Evaluation      Cervical Myotomes:          C5 (Deltoid):  Left: 5      C6 (Biceps):  Left: 5      C7 (Triceps):  Left: 5      C8 (Thumb Ext): Left: 3+      T1 (Intrinsics): Left: 4+                                                              General     ROS    Assessment/Plan:    SUBJECTIVE  Subjective changes as noted by pt:  Edward reports that he is progressively improving. The pain is very little, most noticeable sx is the weakness in the arm, which is reportedly improving. 85% to where he wants to be.        Current pain level: 1/10     Changes in function:  Yes (See Goal flowsheet attached for changes in current functional level)     Adverse reaction to treatment or activity:  None    OBJECTIVE  Changes in objective findings:  Yes, See physical exam section and/or daily flowsheet for response to repeated movements.           ASSESSMENT  Isacc continues to require intervention to meet STG and LTG's: PT  Patient's symptoms are resolving.  Patient is progressing as expected.  Response to therapy has shown an improvement in  pain level and radicular symptoms  Progress made towards STG/LTG?  Yes (See Goal flowsheet attached for updates on achievement of STG and LTG)    PLAN  Continue current treatment plan until patient demonstrates readiness to progress to higher level exercises.    PTA/ATC plan:  N/A    Please refer to the daily flowsheet for treatment today, total treatment time and time spent performing 1:1 timed codes.

## 2020-01-23 NOTE — PROGRESS NOTES
Subjective:  HPI                    Objective:  System              Cervical/Thoracic Evaluation      Cervical Myotomes:          C5 (Deltoid):  Left: 5      C6 (Biceps):  Left: 5      C7 (Triceps):  Left: 5      C8 (Thumb Ext): Left: 4      T1 (Intrinsics): Left: 5-                                                              [place]is Cervical Evaluation      Movement Loss:    Flexion (Flex): nil  Retraction (RET): nil  Extension (EXT): min  Lateral Flexion Right (LF R): nil  Lateral Flexion Left (LF L): nil and pain  Rotation Right (ROT R): nil  Rotation Left (ROT L): nil                                                 ROS    Assessment/Plan:    PROGRESS  REPORT    Progress reporting period is from 11/22/19 to 1/24/20.       SUBJECTIVE  Subjective changes noted by patient:  Edward reports that he is 95% to where he wants to be. Still gets occasional pain in the elbow and some tingling in the L hand. Notes that he sometimes forgets to do his exercises because symptoms don't remind him to perform it. Working on L tricep strength. Feels like his arm is getting stronger.          Current pain level is 1/10  .     Previous pain level was  8/10  .   Changes in function:  Yes (See Goal flowsheet attached for changes in current functional level)  Adverse reaction to treatment or activity: None    OBJECTIVE  Changes noted in objective findings:  Yes, See physical exam section and/or daily flowsheet for response to repeated movements.           ASSESSMENT/PLAN  Updated problem list and treatment plan: Diagnosis 1:  L cervical below elbow derangement  Pain -  manual therapy, self management, education, directional preference exercise and home program  Decreased ROM/flexibility - manual therapy, therapeutic exercise, therapeutic activity and home program  Decreased strength - therapeutic exercise, therapeutic activities and home program  Inflammation - self management/home program  Impaired muscle performance - neuro  re-education and home program  Decreased function - therapeutic activities and home program  Impaired posture - neuro re-education, therapeutic activities and home program  STG/LTGs have been met or progress has been made towards goals:  Yes (See Goal flow sheet completed today.)  Assessment of Progress: Patient is meeting short term goals and is progressing towards long term goals.  Self Management Plans:  Patient has been instructed in a home treatment program.  Patient  has been instructed in self management of symptoms.  I have re-evaluated this patient and find that the nature, scope, duration and intensity of the therapy is appropriate for the medical condition of the patient.  Isacc continues to require the following intervention to meet STG and LTG's:  PT    Recommendations:  Patient would benefit from the following:  Continue with HEP, return/phone follow up in 2-3 wks if sx not managed fully.             Please refer to the daily flowsheet for treatment today, total treatment time and time spent performing 1:1 timed codes.

## 2020-01-24 ENCOUNTER — THERAPY VISIT (OUTPATIENT)
Dept: PHYSICAL THERAPY | Facility: CLINIC | Age: 60
End: 2020-01-24
Payer: COMMERCIAL

## 2020-01-24 DIAGNOSIS — M54.12 CERVICAL RADICULOPATHY: ICD-10-CM

## 2020-01-24 PROCEDURE — 97140 MANUAL THERAPY 1/> REGIONS: CPT | Mod: GP | Performed by: PHYSICAL THERAPIST

## 2020-01-24 PROCEDURE — 97530 THERAPEUTIC ACTIVITIES: CPT | Mod: GP | Performed by: PHYSICAL THERAPIST

## 2020-01-24 PROCEDURE — 97110 THERAPEUTIC EXERCISES: CPT | Mod: GP | Performed by: PHYSICAL THERAPIST

## 2020-02-23 ENCOUNTER — HEALTH MAINTENANCE LETTER (OUTPATIENT)
Age: 60
End: 2020-02-23

## 2020-03-12 PROBLEM — M54.12 CERVICAL RADICULOPATHY: Status: RESOLVED | Noted: 2019-11-22 | Resolved: 2020-03-12

## 2020-03-12 NOTE — PROGRESS NOTES
Patient did not return for further treatment and no additional progress was noted.  Please refer to the progress note and goal flowsheet completed on 01/24/20 for discharge information.

## 2020-07-20 ENCOUNTER — MYC MEDICAL ADVICE (OUTPATIENT)
Dept: FAMILY MEDICINE | Facility: CLINIC | Age: 60
End: 2020-07-20

## 2020-07-21 NOTE — TELEPHONE ENCOUNTER
E-visit/Virtual visit/Telephone visit instructions given to Isacc to further discuss foot pain.       Mahesh Epstein RN, BSN, PHN

## 2020-07-22 ENCOUNTER — VIRTUAL VISIT (OUTPATIENT)
Dept: FAMILY MEDICINE | Facility: CLINIC | Age: 60
End: 2020-07-22
Payer: COMMERCIAL

## 2020-07-22 DIAGNOSIS — M79.671 RIGHT FOOT PAIN: Primary | ICD-10-CM

## 2020-07-22 DIAGNOSIS — M79.671 RIGHT FOOT PAIN: ICD-10-CM

## 2020-07-22 LAB
ERYTHROCYTE [DISTWIDTH] IN BLOOD BY AUTOMATED COUNT: 11.7 % (ref 10–15)
HCT VFR BLD AUTO: 46 % (ref 40–53)
HGB BLD-MCNC: 15.7 G/DL (ref 13.3–17.7)
MCH RBC QN AUTO: 31 PG (ref 26.5–33)
MCHC RBC AUTO-ENTMCNC: 34.1 G/DL (ref 31.5–36.5)
MCV RBC AUTO: 91 FL (ref 78–100)
PLATELET # BLD AUTO: 236 10E9/L (ref 150–450)
RBC # BLD AUTO: 5.07 10E12/L (ref 4.4–5.9)
WBC # BLD AUTO: 5.3 10E9/L (ref 4–11)

## 2020-07-22 PROCEDURE — 84550 ASSAY OF BLOOD/URIC ACID: CPT | Performed by: NURSE PRACTITIONER

## 2020-07-22 PROCEDURE — 73630 X-RAY EXAM OF FOOT: CPT | Mod: RT

## 2020-07-22 PROCEDURE — 85027 COMPLETE CBC AUTOMATED: CPT | Performed by: NURSE PRACTITIONER

## 2020-07-22 PROCEDURE — 99213 OFFICE O/P EST LOW 20 MIN: CPT | Mod: GT | Performed by: NURSE PRACTITIONER

## 2020-07-22 PROCEDURE — 36415 COLL VENOUS BLD VENIPUNCTURE: CPT | Performed by: NURSE PRACTITIONER

## 2020-07-22 ASSESSMENT — PAIN SCALES - GENERAL: PAINLEVEL: EXTREME PAIN (8)

## 2020-07-22 NOTE — PROGRESS NOTES
"Isacc Louis is a 59 year old male who is being evaluated via a billable video visit.      The patient has been notified of following:     \"This video visit will be conducted via a call between you and your physician/provider. We have found that certain health care needs can be provided without the need for an in-person physical exam.  This service lets us provide the care you need with a video conversation.  If a prescription is necessary we can send it directly to your pharmacy.  If lab work is needed we can place an order for that and you can then stop by our lab to have the test done at a later time.    Video visits are billed at different rates depending on your insurance coverage.  Please reach out to your insurance provider with any questions.    If during the course of the call the physician/provider feels a video visit is not appropriate, you will not be charged for this service.\"    Patient has given verbal consent for Video visit? Yes  How would you like to obtain your AVS? MyChart  If you are dropped from the video visit, the video invite should be resent to: Text to cell phone: [phone]  Will anyone else be joining your video visit? No      Subjective     Isacc Louis is a 59 year old male who presents today via video visit for the following health issues:    HPI    Joint Pain    Onset: few months     Description:   Location: right foot - below 4th digit   Character: feels liking stepping on something like a rock.     Intensity: moderate, 8/10    Progression of Symptoms: worse, intermittent    Accompanying Signs & Symptoms:  Other symptoms: numbness    History:   Previous similar pain: no       Precipitating factors:   Trauma or overuse: no     Alleviating factors:  Improved by: ice    Therapies Tried and outcome: ice and ibuprofen -helped with the left foot but continues to have pain in right foot.  He thinks he had plantar fasciitis last summer but feels like this is different from that. "  Pain is pretty constant, gets worse as the day progresses.  He is planning a hiking trip to [place] in 2 weeks, has been wearing running shoes with no improvement in symptoms.  He stopped running 2 weeks ago- usually ran 1-2 miles, 1-2 times/week but notes no improvement in symptoms.  He has worn hiking boots while at the cabin and states this has helped somewhat.  No known injury/trauma, no other foot problems other than onychomycosis. Pain is at base of 4th MTP, flexing the foot seems to help some, currently taking 4-6 Ibuprofen/day.No gait abnormality, swelling, or redness per patient. No history of gout, no fever or chills.      Video Start Time: 1:25      Patient Active Problem List   Diagnosis     CARDIOVASCULAR SCREENING; LDL GOAL LESS THAN 160     NO ACTIVE PROBLEMS (aka NONE)     Onychomycosis     Sleep disturbances     Elevated blood pressure reading without diagnosis of hypertension     Past Surgical History:   Procedure Laterality Date     COLONOSCOPY WITH CO2 INSUFFLATION N/A 2014    Procedure: COLONOSCOPY WITH CO2 INSUFFLATION;  Surgeon: Kalpana Chen MD;  Location: [facility] OR     ENT SURGERY      Tonsillectomy     NO HISTORY OF SURGERY         Social History     Tobacco Use     Smoking status: Never Smoker     Smokeless tobacco: Never Used   Substance Use Topics     Alcohol use: Yes     Comment: 4 beers over the weekend     Family History   Problem Relation Age of Onset     C.A.D. Father          from an MI at 56         Current Outpatient Medications   Medication Sig Dispense Refill     cyclobenzaprine (FLEXERIL) 10 MG tablet Take 1 tablet (10 mg) by mouth nightly as needed for muscle spasms (Patient not taking: Reported on 2020) 30 tablet 1     cyclobenzaprine (FLEXERIL) 10 MG tablet Take 1 tablet (10 mg) by mouth 3 times daily as needed for muscle spasms (Patient not taking: Reported on 2020) 30 tablet 1     doxycycline hyclate (VIBRAMYCIN) 100 MG capsule Take 1 capsule (100 mg) by  mouth 2 times daily (Patient not taking: Reported on 11/15/2019) 14 capsule 0     guaiFENesin-codeine (ROBITUSSIN AC) 100-10 MG/5ML SOLN solution Take 5-10 mLs by mouth 2 times daily as needed for cough (Patient not taking: Reported on 11/15/2019) 120 mL 0     BP Readings from Last 3 Encounters:   11/15/19 (!) 138/94   11/27/18 132/90   01/10/17 150/90    Wt Readings from Last 3 Encounters:   11/15/19 (P) 66.9 kg (147 lb 6.4 oz)   11/27/18 79.4 kg (175 lb)   01/10/17 83.4 kg (183 lb 12.8 oz)                    Reviewed and updated as needed this visit by Provider         Review of Systems   Constitutional, HEENT, cardiovascular, pulmonary, gi and gu systems are negative, except as otherwise noted.      Objective             Physical Exam     GENERAL: Healthy, alert and no distress  EYES: Eyes grossly normal to inspection.  No discharge or erythema, or obvious scleral/conjunctival abnormalities.  RESP: No audible wheeze, cough, or visible cyanosis.  No visible retractions or increased work of breathing.    SKIN: Visible skin clear. No significant rash, abnormal pigmentation or lesions.  NEURO: Cranial nerves grossly intact.  Mentation and speech appropriate for age.  PSYCH: Mentation appears normal, affect normal/bright, judgement and insight intact, normal speech and appearance well-groomed.      Diagnostic Test Results:  Labs reviewed in Epic  Getting labs and rght foot film        Assessment & Plan     1. Right foot pain  Getting foot film, recommended he wear a hard shoe for now, continue with Ibuprofen, ice, elevation, also checking CBC aand  uric acid.  - XR Foot Right G/E 3 Views; Future           No follow-ups on file.    NIDA Braxton CNP  [facility]      Video-Visit Details    Type of service:  Video Visit    Video End Time:1:36 PM    Originating Location (pt. Location): Home    Distant Location (provider location):  [facility]     Platform used for Video  Visit: Suzy    No follow-ups on file.       NIDA Braxton CNP

## 2020-07-22 NOTE — PATIENT INSTRUCTIONS
At [facility], we strive to deliver an exceptional experience to you, every time we see you. If you receive a survey, please complete it as we do value your feedback.  If you have MyChart, you can expect to receive results automatically within 24 hours of their completion.  Your provider will send a note interpreting your results as well.   If you do not have MyChart, you should receive your results in about a week by mail.    Your care team:                            Family Medicine Internal Medicine   MD Aric Sarmiento MD Shantel Branch-Fleming, MD Srinivasa Vaka, MD Katya Georgiev PA-C Megan Hill, APRN CNP    Nick Alvarez, MD Pediatrics   Ata Nixon, PAYamilexC  Inga Miller, CNP MD Alva Blandon APRN CNP   MD Laurel Sheets MD Deborah Mielke, MD Kasey Cochran, APRN CNP  Annamaria De Jesus, DEZ Osborne, CNP  MD Sunshnie Gavlan MD Angela Wermerskirchen, MD      Clinic hours: Monday - Thursday 7 am-7 pm; Fridays 7 am-5 pm.   Urgent care: Monday - Friday 11 am-9 pm; Saturday and Sunday 9 am-5 pm.    Clinic: [phone]       [facility]: Monday - Thursday 8 am - 7 pm; Friday 8 am - 6 pm  [facility]: [phone]     Use www.oncare.org for 24/7 diagnosis and treatment of dozens of conditions.    Patient Education     Self-Care for Strains and Sprains  Most minor strains and sprains can be treated with self-care. Recovering from a strain or sprain may take 6 to 8 weeks. Your self-care goal is to reduce pain and immobilize the injury to speed healing.     A sprain injures ligaments (tissue that connects bones to bones).      A strain injures muscles or tendons (tissue that connects muscles to bones).   Support the injured area  Wrapping the injured area provides support for short, necessary activities. Be careful not to wrap the area too tightly.  This could cut off the blood supply.    Support a wrist, elbow, or shoulder with a sling.    Wrap an ankle or knee with an elastic bandage.    Tape a finger or toe to the one next to it.  Use cold and heat  Cold reduces swelling. Both cold and heat reduce pain. Heat should not be used in the initial treatment of the injury. When using cold or heat, always place a thin towel between the pack and your skin.    Apply ice or a cold pack 10 to 15 minutes every hour you re awake for the first 2 days.    After the swelling goes down, use cold or heat to control pain. Don t use heat late in the day, since it can cause swelling when you re not active.  Rest and elevate  Rest and elevation help your injury heal faster.    Raise the injured area above your heart level.    Keep the injured area from moving.    Limit the use of the joint or limb.  Use medicine    Aspirin reduces pain and swelling. (Note: Don t give aspirin to a child 18 or younger unless prescribed by the doctor.)    Non-steroidal anti-inflammatory medicines, such as ibuprofen, may reduce pain and swelling, as well. Ask your healthcare provider for advice.  When to call your healthcare provider  Call your healthcare provider if:    The injured joint won t move, or bones make a grating sound when they move    You can t put weight on the injured area, even after 24 hours    The injured body part is cold, blue, tingling, or numb    The joint or limb appears bent or crooked.    Pain increases or doesn t improve in 4 days    When pressing along the injured area, you notice a spot that is especially painful  Date Last Reviewed: 5/1/2018 2000-2019 [employer]. [address] [place], [place] [id number]. All rights reserved. This information is not intended as a substitute for professional medical care. Always follow your healthcare professional's instructions.           Patient Education     Wearing Proper Shoes                    You walk on your feet  every day, forcing them to support the weight of your body. Repeated stress on your feet can cause damage over time. The right shoes can help protect your feet. The wrong shoes can cause more foot problems. Read the information below to help you find a shoe that fits your foot needs.      A good shoe fit will cover your foot outline. A shoe that doesn t cover the outline is a bad fit.   What s your foot shape?  To get a good fit, you need to know the shape of your foot. Do this simple test: While standing, place your foot on a piece of paper and trace around it. Is your foot straight or curved? Do you have a foot problem, such as a bunion, that causes your foot outline to show a bulge on the side of your big toe?  Finding your fit  Bring your foot outline to the shoe store to help you find the right shoe. Place a shoe you like on top of the outline to see if it matches the shape. The shoe should cover the outline. (If you have a bunion, the shoe may not cover the bulge on the outline. Look for soft leather shoes to stretch over the bunion.) Once you ve found a pair of proper shoes, put them on. Walk around. Be sure the shoes don t rub or pinch. If the shoes feel good, you ve found your fit!  The right shoe for you  A good shoe has features that provide comfort and support. It must also be the right size and shape for your feet. Look for a shoe made of breathable fabric and lining, such as leather or canvas. Be sure that shoes have enough tread to prevent slipping. Go to a good shoe store for help finding the right shoe.  Good shoe features  An ideal shoe has the following:    Laces for support. If tying laces is a problem for you, try shoes with Velcro fasteners or armando.    A front of the shoe (toe box) with   inch space in front of your longest toes.    An arch shape that supports your foot.    No more than 1  inches of heel.    A stiff, snug back of the shoe to keep your foot from sliding around.    A smooth  lining with no rough seams.  Shoe shopping tips  Below are some do s and don ts for when you go to the shoe store.  Do:    Select the shoes that feel right. Wear them around the house. Then bring them to your foot healthcare provider to check for fit. If they don t fit well, return them.    Shop late in the day, when your feet will be slightly bigger.    Each time you buy shoes, have both your feet measured while you are standing. Foot size changes with time.    Pick shoes to suit their purpose. High heels are OK for an occasional night on the town. But for everyday wear, choose a more sensible shoe.    Try on shoes while wearing any inserts specially made for your feet (orthoses).    Try on both the right and left shoes. If your feet are different sizes, pick a pair that fits the larger foot.  Don t:    Don t buy shoes based on shoe size alone. Always try on shoes, as sizes differ from brand to brand and within brands.    Don t expect shoes to  break in.  If they don t fit at the store, don t buy them.    Don t buy a shoe that doesn t match your foot shape.  What about socks?  Always wear socks with shoes. Socks help absorb sweat and reduce friction and blistering. When shopping for shoes, choose soft, padded socks with seams that don t irritate your feet.  If you have foot problems  Some foot problems cause deformities. This can make it hard to find a good fit. Look for shoes made of soft leather to stretch over the deformity. If you have bunions, buy shoes with a wider toe box. To fit hammertoes, look for shoes with a tall toe box. If you have arch problems, you may need inserts. In some cases, you ll need to have custom footwear or orthoses made for your feet.  Suggested footwear  Ask your healthcare provider what kind of footwear you need. He or she may recommend a certain brand or shoestore.  Date Last Reviewed: 8/1/2016 2000-2019 [employer]. [address] [place] [address], [place] [address]. All  rights reserved. This information is not intended as a substitute for professional medical care. Always follow your healthcare professional's instructions.

## 2020-07-23 LAB — URATE SERPL-MCNC: 5.3 MG/DL (ref 3.5–7.2)

## 2020-07-31 ENCOUNTER — OFFICE VISIT (OUTPATIENT)
Dept: PODIATRY | Facility: CLINIC | Age: 60
End: 2020-07-31
Payer: COMMERCIAL

## 2020-07-31 VITALS — SYSTOLIC BLOOD PRESSURE: 140 MMHG | DIASTOLIC BLOOD PRESSURE: 80 MMHG

## 2020-07-31 DIAGNOSIS — D36.10 NEUROMA: Primary | ICD-10-CM

## 2020-07-31 PROCEDURE — 20550 NJX 1 TENDON SHEATH/LIGAMENT: CPT | Performed by: PODIATRIST

## 2020-07-31 PROCEDURE — 99203 OFFICE O/P NEW LOW 30 MIN: CPT | Mod: 25 | Performed by: PODIATRIST

## 2020-07-31 NOTE — LETTER
2020         RE: Isacc Louis  [address] [id number]        Dear Colleague,    Thank you for referring your patient, Isacc Louis, to the [facility]. Please see a copy of my visit note below.    Subjective:    Pt is seen today in consult from Kasey Alvarez.  Pt has 3 month history of right foot pain.  Describes as a burning or tingling sensation which is aggravated with weightbearing and relieved by rest.  Points to 3rd interspace. Also describes the feeling of the sock balled up in the end of the shoe.  Denies any history of trauma.  Positive for numbness.   certain wide shoes make feel better.  He had a little bit of this pain in the contralateral foot but this has resolved.  He is going backpacking with his kids tomorrow and he is concerned.  He denies numbness in any other digits.    ROS:  A 10-point review of systems was performed and is positive for that noted in the HPI and as seen above.  All other areas are negative.        No Known Allergies    No current outpatient medications on file.       Patient Active Problem List   Diagnosis     CARDIOVASCULAR SCREENING; LDL GOAL LESS THAN 160     NO ACTIVE PROBLEMS (aka NONE)     Onychomycosis     Sleep disturbances     Elevated blood pressure reading without diagnosis of hypertension       Past Medical History:   Diagnosis Date     CARDIOVASCULAR SCREENING; LDL GOAL LESS THAN 160 2013     NO ACTIVE PROBLEMS (aka NONE)        Past Surgical History:   Procedure Laterality Date     COLONOSCOPY WITH CO2 INSUFFLATION N/A 2014    Procedure: COLONOSCOPY WITH CO2 INSUFFLATION;  Surgeon: Kalpana Chen MD;  Location: [facility] OR     ENT SURGERY      Tonsillectomy     NO HISTORY OF SURGERY         Family History   Problem Relation Age of Onset     C.A.D. Father          from an MI at 57       Social History     Tobacco Use     Smoking status: Never Smoker     Smokeless tobacco: Never Used   Substance Use  Topics     Alcohol use: Yes     Comment: 4 beers over the weekend         Exam:    Vitals: BP (!) 140/80   BMI: There is no height or weight on file to calculate BMI.  Height: Data Unavailable    Constitutional/ general:  Pt is in no apparent distress, appears well-nourished.  Cooperative with history and physical exam.     Psych:  The patient answered questions appropriately.  Normal affect.  Seems to have reasonable expectations, in terms of treatment.     Eyes:  Visual scanning/ tracking without deficit.     Ears:  Response to auditory stimuli is normal.  negative hearing aid devices.  Auricles in proper alignment.     Lymphatic:  Popliteal lymph nodes not enlarged.     Lungs:  Non labored breathing, non labored speech. No cough.  No audible wheezing. Even, quiet breathing.       Vascular:  positive pedal pulses bilaterally for both the DP and PT arteries.  CFT < 3 sec.  negative ankle edema.  positive pedal hair growth.    Neuro:  Alert and oriented x 3. Coordinated gait.  Light touch sensation is intact to the L4, L5, S1 distributions. No obvious deficits.  No evidence of neurological-based weakness, spasticity, or contracture in the lower extremities.      Derm: Normal texture and turgor.  No erythema, ecchymosis, or cyanosis.      Musculoskeletal:     Patient is ambulatory without an assistive device or brace.   Normal arch with weightbearing.  No forefoot or rear foot deformities noted.  MS 5/5 all compartments.  Normal ROM all fore foot and rearfoot joints.  No equinus.   Pain upon palpation to the right third intermetatarsal space.    Positive rachel's sign noted.  No erythema or subcutaneous masses noted.  No pain on the met heads or dorsal on the metatarsal necks.  Negative Tinnel's sign.    Radiographic Exam:  X-Ray Findings:  I personally reviewed the films.  Normal    Assessment: Neuroma third Intermetatarsal Space right foot    Plan:  Disscussed etiology and treatment options at great detail.   Recommended changing shoewear to wide shoes, limiting periods of standing, and not going barefoot.  We gave the patient metatarsal pads to try to offload this.  He already has an arch support.  We discussed orthotics.  He will call me if he would like a pair.  We discussed injection.  He would like to do this.  Risks complications, and efficacy of cortisone injection discussed.  Patient understands there is a risk of plantar fascial rupture.  After written consent, sterile prep, injected right third interspace with 1 cc 1% lidocaine plain and 1 cc kenalog 10 mg.  Discussed importance of resolving this so it does not become permanent.  Discussed second injection if still painful.  Return to clinic prn.  Thank you for allowing me participate in the care of this patient.          Robert Jacobson DPM DPM, FACFAS        Again, thank you for allowing me to participate in the care of your patient.        Sincerely,        Robert Jacobson DPM

## 2020-07-31 NOTE — PATIENT INSTRUCTIONS
We wish you continued good healing. If you have any questions or concerns, please do not hesitate to contact us at [phone]    Please remember to call and schedule a follow up appointment if one was recommended at your earliest convenience.   PODIATRY CLINIC HOURS  TELEPHONE NUMBER    Dr. Robert Jacobson D.P.M [facility]    Clinics:  [facility]    Renetta Whaley [facility]   Tuesday 1PM-6PM  [place]  Wednesday 7AM-2PM  [facility]  Thursday 10AM-6PM  [place]  Friday 7AM-3PM  [place]  Specialty schedulers:   [phone] to make an appointment with any Specialty Provider.        Urgent Care locations:    [facility] Monday-Friday 5 pm - 9 pm. Saturday-Sunday 9 am -5pm    Monday-Friday 11 am - 9 pm Saturday 9 am - 5 pm     Monday-Sunday 12 noon-8PM [phone]     If you need a medication refill, please contact us you may need lab work and/or a follow up visit prior to your refill (i.e. Antifungal medications).    [employer] (secure e-mail communication and access to your chart) to send a message or to make an appointment.    If MRI needed please call [facility]arg[facility] at [phone]

## 2020-07-31 NOTE — PROGRESS NOTES
Subjective:    Pt is seen today in consult from Kasey Alvarez.  Pt has 3 month history of right foot pain.  Describes as a burning or tingling sensation which is aggravated with weightbearing and relieved by rest.  Points to 3rd interspace. Also describes the feeling of the sock balled up in the end of the shoe.  Denies any history of trauma.  Positive for numbness.   certain wide shoes make feel better.  He had a little bit of this pain in the contralateral foot but this has resolved.  He is going backpacking with his kids tomorrow and he is concerned.  He denies numbness in any other digits.    ROS:  A 10-point review of systems was performed and is positive for that noted in the HPI and as seen above.  All other areas are negative.        No Known Allergies    No current outpatient medications on file.       Patient Active Problem List   Diagnosis     CARDIOVASCULAR SCREENING; LDL GOAL LESS THAN 160     NO ACTIVE PROBLEMS (aka NONE)     Onychomycosis     Sleep disturbances     Elevated blood pressure reading without diagnosis of hypertension       Past Medical History:   Diagnosis Date     CARDIOVASCULAR SCREENING; LDL GOAL LESS THAN 160 2013     NO ACTIVE PROBLEMS (aka NONE)        Past Surgical History:   Procedure Laterality Date     COLONOSCOPY WITH CO2 INSUFFLATION N/A 2014    Procedure: COLONOSCOPY WITH CO2 INSUFFLATION;  Surgeon: Kalpana Chen MD;  Location: [facility] OR     ENT SURGERY      Tonsillectomy     NO HISTORY OF SURGERY         Family History   Problem Relation Age of Onset     C.A.D. Father          from an MI at 57       Social History     Tobacco Use     Smoking status: Never Smoker     Smokeless tobacco: Never Used   Substance Use Topics     Alcohol use: Yes     Comment: 4 beers over the weekend         Exam:    Vitals: BP (!) 140/80   BMI: There is no height or weight on file to calculate BMI.  Height: Data Unavailable    Constitutional/ general:  Pt is in no apparent distress,  appears well-nourished.  Cooperative with history and physical exam.     Psych:  The patient answered questions appropriately.  Normal affect.  Seems to have reasonable expectations, in terms of treatment.     Eyes:  Visual scanning/ tracking without deficit.     Ears:  Response to auditory stimuli is normal.  negative hearing aid devices.  Auricles in proper alignment.     Lymphatic:  Popliteal lymph nodes not enlarged.     Lungs:  Non labored breathing, non labored speech. No cough.  No audible wheezing. Even, quiet breathing.       Vascular:  positive pedal pulses bilaterally for both the DP and PT arteries.  CFT < 3 sec.  negative ankle edema.  positive pedal hair growth.    Neuro:  Alert and oriented x 3. Coordinated gait.  Light touch sensation is intact to the L4, L5, S1 distributions. No obvious deficits.  No evidence of neurological-based weakness, spasticity, or contracture in the lower extremities.      Derm: Normal texture and turgor.  No erythema, ecchymosis, or cyanosis.      Musculoskeletal:     Patient is ambulatory without an assistive device or brace.   Normal arch with weightbearing.  No forefoot or rear foot deformities noted.  MS 5/5 all compartments.  Normal ROM all fore foot and rearfoot joints.  No equinus.   Pain upon palpation to the right third intermetatarsal space.    Positive rachel's sign noted.  No erythema or subcutaneous masses noted.  No pain on the met heads or dorsal on the metatarsal necks.  Negative Tinnel's sign.    Radiographic Exam:  X-Ray Findings:  I personally reviewed the films.  Normal    Assessment: Neuroma third Intermetatarsal Space right foot    Plan:  Disscussed etiology and treatment options at great detail.  Recommended changing shoewear to wide shoes, limiting periods of standing, and not going barefoot.  We gave the patient metatarsal pads to try to offload this.  He already has an arch support.  We discussed orthotics.  He will call me if he would like a pair.   We discussed injection.  He would like to do this.  Risks complications, and efficacy of cortisone injection discussed.  Patient understands there is a risk of plantar fascial rupture.  After written consent, sterile prep, injected right third interspace with 1 cc 1% lidocaine plain and 1 cc kenalog 10 mg.  Discussed importance of resolving this so it does not become permanent.  Discussed second injection if still painful.  Return to clinic prn.  Thank you for allowing me participate in the care of this patient.          Robert Jacobson, ANEESH DPSYLVESTER, FACFAS

## 2020-09-23 ENCOUNTER — OFFICE VISIT (OUTPATIENT)
Dept: PODIATRY | Facility: CLINIC | Age: 60
End: 2020-09-23
Payer: COMMERCIAL

## 2020-09-23 VITALS
BODY MASS INDEX: 25.09 KG/M2 | WEIGHT: 165 LBS | DIASTOLIC BLOOD PRESSURE: 100 MMHG | SYSTOLIC BLOOD PRESSURE: 140 MMHG | HEART RATE: 70 BPM

## 2020-09-23 DIAGNOSIS — D36.10 NEUROMA: Primary | ICD-10-CM

## 2020-09-23 PROCEDURE — 99213 OFFICE O/P EST LOW 20 MIN: CPT | Mod: 25 | Performed by: PODIATRIST

## 2020-09-23 PROCEDURE — 20550 NJX 1 TENDON SHEATH/LIGAMENT: CPT | Performed by: PODIATRIST

## 2020-09-23 NOTE — PATIENT INSTRUCTIONS
We wish you continued good healing. If you have any questions or concerns, please do not hesitate to contact us at [phone]    Please remember to call and schedule a follow up appointment if one was recommended at your earliest convenience.   PODIATRY CLINIC HOURS  TELEPHONE NUMBER    Dr. Robert Jacobson D.P.M [facility]    Clinics:  [facility]    Renetta Whaley [facility]   Tuesday 1PM-6PM  [place]  Wednesday 7AM-2PM  [facility]  Thursday 10AM-6PM  [place]  Friday 7AM-3PM  [place]  Specialty schedulers:   [phone] to make an appointment with any Specialty Provider.        Urgent Care locations:    [facility] Monday-Friday 5 pm - 9 pm. Saturday-Sunday 9 am -5pm    Monday-Friday 11 am - 9 pm Saturday 9 am - 5 pm     Monday-Sunday 12 noon-8PM [phone]     If you need a medication refill, please contact us you may need lab work and/or a follow up visit prior to your refill (i.e. Antifungal medications).    [employer] (secure e-mail communication and access to your chart) to send a message or to make an appointment.    If MRI needed please call [facility]argas at [phone]

## 2020-09-23 NOTE — LETTER
9/23/2020         RE: Isacc Louis  [address] [address]        Dear Colleague,    Thank you for referring your patient, Isacc Louis, to the [facility]. Please see a copy of my visit note below.    Subjective:    7/31/20   Pt is seen today in consult from Kasey Alvarez.  Pt has 3 month history of right foot pain.  Describes as a burning or tingling sensation which is aggravated with weightbearing and relieved by rest.  Points to 3rd interspace. Also describes the feeling of the sock balled up in the end of the shoe.  Denies any history of trauma.  Positive for numbness.   certain wide shoes make feel better.  He had a little bit of this pain in the contralateral foot but this has resolved.  He is going backpacking with his kids tomorrow and he is concerned.  He denies numbness in any other digits.    9/23/20 patient parents for evaluation of right third neuroma.  Much better since injection last visit.  It is still causing pain though.  He is wondering about another injection.  He has been wearing wider shoes.  He still getting numbness.  He denies any increased deformity weakness ecchymosis.    ROS:  See above        No Known Allergies    No current outpatient medications on file.       Patient Active Problem List   Diagnosis     CARDIOVASCULAR SCREENING; LDL GOAL LESS THAN 160     NO ACTIVE PROBLEMS (aka NONE)     Onychomycosis     Sleep disturbances     Elevated blood pressure reading without diagnosis of hypertension       Past Medical History:   Diagnosis Date     CARDIOVASCULAR SCREENING; LDL GOAL LESS THAN 160 6/18/2013     NO ACTIVE PROBLEMS (aka NONE)        Past Surgical History:   Procedure Laterality Date     COLONOSCOPY WITH CO2 INSUFFLATION N/A 12/19/2014    Procedure: COLONOSCOPY WITH CO2 INSUFFLATION;  Surgeon: Kalpana Chen MD;  Location: [facility] OR     ENT SURGERY      Tonsillectomy     NO HISTORY OF SURGERY         Family History   Problem  Relation Age of Onset     C.A.D. Father          from an MI at 56       Social History     Tobacco Use     Smoking status: Never Smoker     Smokeless tobacco: Never Used   Substance Use Topics     Alcohol use: Yes     Comment: 4 beers over the weekend         Exam:    Vitals: There were no vitals taken for this visit.  BMI: There is no height or weight on file to calculate BMI.  Height: Data Unavailable    Constitutional/ general:  Pt is in no apparent distress, appears well-nourished.  Cooperative with history and physical exam.     Psych:  The patient answered questions appropriately.  Normal affect.  Seems to have reasonable expectations, in terms of treatment.     Eyes:  Visual scanning/ tracking without deficit.     Ears:  Response to auditory stimuli is normal.  negative hearing aid devices.  Auricles in proper alignment.     Lymphatic:  Popliteal lymph nodes not enlarged.     Lungs:  Non labored breathing, non labored speech. No cough.  No audible wheezing. Even, quiet breathing.       Vascular:  positive pedal pulses bilaterally for both the DP and PT arteries.  CFT < 3 sec.  negative ankle edema.  positive pedal hair growth.    Neuro:  Alert and oriented x 3. Coordinated gait.  Light touch sensation is intact to the L4, L5, S1 distributions. No obvious deficits.  No evidence of neurological-based weakness, spasticity, or contracture in the lower extremities.      Derm: Normal texture and turgor.  No erythema, ecchymosis, or cyanosis.      Musculoskeletal:     Patient is ambulatory without an assistive device or brace.   Normal arch with weightbearing.  No forefoot or rear foot deformities noted.  MS 5/5 all compartments.  Normal ROM all fore foot and rearfoot joints.  No equinus.   Pain upon palpation to the right third intermetatarsal space.    Positive rachel's sign noted.  No erythema or subcutaneous masses noted.  No pain on the met heads or dorsal on the metatarsal necks.  Negative Tinnel's  sign.    Radiographic Exam:  X-Ray Findings:  I personally reviewed the films.  Normal    Assessment: Neuroma third Intermetatarsal Space right foot    Plan:  Disscussed etiology and treatment options at great detail.  Discussed orthotics with patient.  He would like to try this.  We wrote him a prescription.  He is already using a metatarsal pad.  He is already wearing wide shoes.  He would like another injection.  Risks complications, and efficacy of cortisone injection discussed.   After written consent, sterile prep, injected right third interspace with 1 cc 1% lidocaine plain and 1 cc kenalog 10 mg.  Discussed we could do another injection if still painful.  Discussed if this injection fails we could surgically remove this.  RTC PRN        Robert Jacobson DPM DPM, FACFAS        Again, thank you for allowing me to participate in the care of your patient.        Sincerely,        Robert Jacobson DPM

## 2020-09-23 NOTE — PROGRESS NOTES
Subjective:    21   Pt is seen today in consult from Kasey Alvarez.  Pt has 3 month history of right foot pain.  Describes as a burning or tingling sensation which is aggravated with weightbearing and relieved by rest.  Points to 3rd interspace. Also describes the feeling of the sock balled up in the end of the shoe.  Denies any history of trauma.  Positive for numbness.   certain wide shoes make feel better.  He had a little bit of this pain in the contralateral foot but this has resolved.  He is going backpacking with his kids tomorrow and he is concerned.  He denies numbness in any other digits.    21 patient parents for evaluation of right third neuroma.  Much better since injection last visit.  It is still causing pain though.  He is wondering about another injection.  He has been wearing wider shoes.  He still getting numbness.  He denies any increased deformity weakness ecchymosis.    ROS:  See above        No Known Allergies    No current outpatient medications on file.       Patient Active Problem List   Diagnosis     CARDIOVASCULAR SCREENING; LDL GOAL LESS THAN 160     NO ACTIVE PROBLEMS (aka NONE)     Onychomycosis     Sleep disturbances     Elevated blood pressure reading without diagnosis of hypertension       Past Medical History:   Diagnosis Date     CARDIOVASCULAR SCREENING; LDL GOAL LESS THAN 160 2013     NO ACTIVE PROBLEMS (aka NONE)        Past Surgical History:   Procedure Laterality Date     COLONOSCOPY WITH CO2 INSUFFLATION N/A 2014    Procedure: COLONOSCOPY WITH CO2 INSUFFLATION;  Surgeon: Kalpana Chen MD;  Location: [facility] OR     ENT SURGERY      Tonsillectomy     NO HISTORY OF SURGERY         Family History   Problem Relation Age of Onset     C.A.D. Father          from an MI at 56       Social History     Tobacco Use     Smoking status: Never Smoker     Smokeless tobacco: Never Used   Substance Use Topics     Alcohol use: Yes     Comment: 4 beers over the weekend          Exam:    Vitals: There were no vitals taken for this visit.  BMI: There is no height or weight on file to calculate BMI.  Height: Data Unavailable    Constitutional/ general:  Pt is in no apparent distress, appears well-nourished.  Cooperative with history and physical exam.     Psych:  The patient answered questions appropriately.  Normal affect.  Seems to have reasonable expectations, in terms of treatment.     Eyes:  Visual scanning/ tracking without deficit.     Ears:  Response to auditory stimuli is normal.  negative hearing aid devices.  Auricles in proper alignment.     Lymphatic:  Popliteal lymph nodes not enlarged.     Lungs:  Non labored breathing, non labored speech. No cough.  No audible wheezing. Even, quiet breathing.       Vascular:  positive pedal pulses bilaterally for both the DP and PT arteries.  CFT < 3 sec.  negative ankle edema.  positive pedal hair growth.    Neuro:  Alert and oriented x 3. Coordinated gait.  Light touch sensation is intact to the L4, L5, S1 distributions. No obvious deficits.  No evidence of neurological-based weakness, spasticity, or contracture in the lower extremities.      Derm: Normal texture and turgor.  No erythema, ecchymosis, or cyanosis.      Musculoskeletal:     Patient is ambulatory without an assistive device or brace.   Normal arch with weightbearing.  No forefoot or rear foot deformities noted.  MS 5/5 all compartments.  Normal ROM all fore foot and rearfoot joints.  No equinus.   Pain upon palpation to the right third intermetatarsal space.    Positive rachel's sign noted.  No erythema or subcutaneous masses noted.  No pain on the met heads or dorsal on the metatarsal necks.  Negative Tinnel's sign.    Radiographic Exam:  X-Ray Findings:  I personally reviewed the films.  Normal    Assessment: Neuroma third Intermetatarsal Space right foot    Plan:  Disscussed etiology and treatment options at great detail.  Discussed orthotics with patient.  He would  like to try this.  We wrote him a prescription.  He is already using a metatarsal pad.  He is already wearing wide shoes.  He would like another injection.  Risks complications, and efficacy of cortisone injection discussed.   After written consent, sterile prep, injected right third interspace with 1 cc 1% lidocaine plain and 1 cc kenalog 10 mg.  Discussed we could do another injection if still painful.  Discussed if this injection fails we could surgically remove this.  RTC PRN        Robert Jacobson, ANEESH DPM, FACFAS

## 2021-03-31 ENCOUNTER — MYC MEDICAL ADVICE (OUTPATIENT)
Dept: PODIATRY | Facility: CLINIC | Age: 61
End: 2021-03-31

## 2021-04-11 ENCOUNTER — HEALTH MAINTENANCE LETTER (OUTPATIENT)
Age: 61
End: 2021-04-11

## 2021-04-13 ENCOUNTER — OFFICE VISIT (OUTPATIENT)
Dept: PODIATRY | Facility: CLINIC | Age: 61
End: 2021-04-13
Payer: COMMERCIAL

## 2021-04-13 VITALS
HEART RATE: 62 BPM | HEIGHT: 70 IN | DIASTOLIC BLOOD PRESSURE: 99 MMHG | WEIGHT: 170 LBS | BODY MASS INDEX: 24.34 KG/M2 | SYSTOLIC BLOOD PRESSURE: 159 MMHG

## 2021-04-13 DIAGNOSIS — D36.10 NEUROMA: Primary | ICD-10-CM

## 2021-04-13 PROCEDURE — 99214 OFFICE O/P EST MOD 30 MIN: CPT | Performed by: PODIATRIST

## 2021-04-13 ASSESSMENT — MIFFLIN-ST. JEOR: SCORE: 1592.36

## 2021-04-13 NOTE — PATIENT INSTRUCTIONS
We wish you continued good healing. If you have any questions or concerns, please do not hesitate to contact us at [phone]    [employer] (secure e-mail communication and access to your chart) to send a message or to make an appointment.    Please remember to call and schedule a follow up appointment if one was recommended at your earliest convenience.     +++OF MARCH 2020+++ LOCATION AND HOURS HAVE CHANGED    PLEASE CALL CLINICS TO VERIFY DAYS AND TIMES  PODIATRY CLINIC HOURS  TELEPHONE NUMBER    Dr. Robert RENEEPMEAGAN FACFAS        Clinics:  [place], [facility]   Tuesday 1PM-6PM  [place]  Wednesday 745AM-330PM  [place]  Thursday/Friday 745AM-230PM  [address]/[place] APPOINTMENTS  [phone]    [place] APPOINTMENTS  [phone]          If you need a medication refill, please contact us you may need lab work and/or a follow up visit prior to your refill (i.e. Antifungal medications).    If MRI needed please call Imaging at [phone] or [phone]    HOW DO I GET MY KNEE SCOOTER? Knee scooters can be picked up at ANY Medical Supply stores with your knee scooter Prescription.  OR    Bring your signed prescription to an [facility] showroom.          INGROWN TOENAIL REMOVAL AFTERCARE       Go directly home and elevate the affected foot on one or two pillows for the remainder of the day/evening if possible. Your toe may stay numb anywhere from 2-8 hours.     Take Tylenol, ibuprofen or another anti-inflammatory as needed for pain.     That evening of the procedure,  you may remove the bandage.(you may soak it in warm soapy water ) After soaks, pat the area dry and then allow to airdry for a few minutes. Apply antibiotic ointment to the area and cover with  band-aid.    The following day. Find a shoe that is comfortable and minimizes the amount of rubbing on your toe, as this increases pain.    Dress with  band-aids until no longer draining, typically 3 days.    Watch for any signs and symptoms of infection such as: redness, red streaks going up the foot/leg, swelling, pus or foul odor. Fevers > 101     Please call with questions.    Dr. Robert Jacobson D.P.M FAC FAS

## 2021-04-13 NOTE — LETTER
4/13/2021         RE: Isacc Louis  [address] [address]        Dear Colleague,    Thank you for referring your patient, Isacc Louis, to the [facility]. Please see a copy of my visit note below.    Subjective:    7/31/20   Pt is seen today in consult from Kasey Alvarez.  Pt has 3 month history of right foot pain.  Describes as a burning or tingling sensation which is aggravated with weightbearing and relieved by rest.  Points to 3rd interspace. Also describes the feeling of the sock balled up in the end of the shoe.  Denies any history of trauma.  Positive for numbness.   certain wide shoes make feel better.  He had a little bit of this pain in the contralateral foot but this has resolved.  He is going backpacking with his kids tomorrow and he is concerned.  He denies numbness in any other digits.    9/23/20 patient parents for evaluation of right third neuroma.  Much better since injection last visit.  It is still causing pain though.  He is wondering about another injection.  He has been wearing wider shoes.  He still getting numbness.  He denies any increased deformity weakness ecchymosis.    4/13/21   patient returns for evaluation of right third neuroma.  Injection last visit really did not help.  He has done shoe modifications.  He has tried padding this.  It is slowly getting worse.  Is painful on a daily basis.  Pain aggravated by activity and relieved by rest.  Would like to discuss surgery today since it very bothersome and conservative treatments have not worked.    ROS:  See above        No Known Allergies    No current outpatient medications on file.       Patient Active Problem List   Diagnosis     CARDIOVASCULAR SCREENING; LDL GOAL LESS THAN 160     NO ACTIVE PROBLEMS (aka NONE)     Onychomycosis     Sleep disturbances     Elevated blood pressure reading without diagnosis of hypertension       Past Medical History:   Diagnosis Date      "CARDIOVASCULAR SCREENING; LDL GOAL LESS THAN 160 2013     NO ACTIVE PROBLEMS (aka NONE)        Past Surgical History:   Procedure Laterality Date     COLONOSCOPY WITH CO2 INSUFFLATION N/A 2014    Procedure: COLONOSCOPY WITH CO2 INSUFFLATION;  Surgeon: Kalpana Chen MD;  Location: [facility] OR     ENT SURGERY      Tonsillectomy     NO HISTORY OF SURGERY         Family History   Problem Relation Age of Onset     C.A.D. Father          from an MI at 56       Social History     Tobacco Use     Smoking status: Never Smoker     Smokeless tobacco: Never Used   Substance Use Topics     Alcohol use: Yes     Comment: 4 beers over the weekend         Exam:    Vitals: BP (!) 159/99   Pulse 62   Ht 1.778 m (5' 10\")   Wt 77.1 kg (170 lb)   BMI 24.39 kg/m    BMI: Body mass index is 24.39 kg/m .  Height: 5' 10\"    Constitutional/ general:  Pt is in no apparent distress, appears well-nourished.  Cooperative with history and physical exam.     Psych:  The patient answered questions appropriately.  Normal affect.  Seems to have reasonable expectations, in terms of treatment.     Eyes:  Visual scanning/ tracking without deficit.     Ears:  Response to auditory stimuli is normal.  negative hearing aid devices.  Auricles in proper alignment.     Lymphatic:  Popliteal lymph nodes not enlarged.     Lungs:  Non labored breathing, non labored speech. No cough.  No audible wheezing. Even, quiet breathing.       Vascular:  positive pedal pulses bilaterally for both the DP and PT arteries.  CFT < 3 sec.  negative ankle edema.  positive pedal hair growth.    Neuro:  Alert and oriented x 3. Coordinated gait.  Light touch sensation is intact to the L4, L5, S1 distributions. No obvious deficits.  No evidence of neurological-based weakness, spasticity, or contracture in the lower extremities.      Derm: Normal texture and turgor.  No erythema, ecchymosis, or cyanosis.      Musculoskeletal:     Patient is ambulatory without an " assistive device or brace.   Normal arch with weightbearing.  No forefoot or rear foot deformities noted.  MS 5/5 all compartments.  Normal ROM all fore foot and rearfoot joints.  No equinus.   Pain upon palpation to the right third intermetatarsal space.    Positive rachel's sign noted.  No erythema or subcutaneous masses noted.  No pain on the met heads or dorsal on the metatarsal necks.  Negative Tinnel's sign.    Radiographic Exam:  X-Ray Findings:  I personally reviewed the films.  Normal    Assessment: Neuroma third Intermetatarsal Space right foot    Plan:  Disscussed etiology and treatment options at great detail.  Patient would like to discuss surgery today.  This would be same-day surgery.  He would have a plantar incision.  He would have to be nonweightbearing for 3 weeks.  We gave him crutches today.  We discussed complications.  There is a 20% chance of stump neuroma.  Other complications include numbness infection continued pain in the area.  Discussed chance of hypertrophic incision.  Patient would like to do this as soon as possible.  He is very active and he would like to enjoy the summer.  Wrote an order to have surgery on April 27, 2020 1 in the morning.  He will call me if he has any other questions.        Robert Jacobson DPM DPM, FACFAS          Again, thank you for allowing me to participate in the care of your patient.        Sincerely,        Robert Jacobson DPM

## 2021-04-14 ENCOUNTER — TELEPHONE (OUTPATIENT)
Dept: PODIATRY | Facility: CLINIC | Age: 61
End: 2021-04-14

## 2021-04-14 DIAGNOSIS — Z11.59 ENCOUNTER FOR SCREENING FOR OTHER VIRAL DISEASES: ICD-10-CM

## 2021-04-14 PROBLEM — D36.10 NEUROMA: Status: ACTIVE | Noted: 2021-04-14

## 2021-04-14 NOTE — PROGRESS NOTES
Subjective:    7/31/20   Pt is seen today in consult from Kasey Alvarez.  Pt has 3 month history of right foot pain.  Describes as a burning or tingling sensation which is aggravated with weightbearing and relieved by rest.  Points to 3rd interspace. Also describes the feeling of the sock balled up in the end of the shoe.  Denies any history of trauma.  Positive for numbness.   certain wide shoes make feel better.  He had a little bit of this pain in the contralateral foot but this has resolved.  He is going backpacking with his kids tomorrow and he is concerned.  He denies numbness in any other digits.    9/23/20 patient parents for evaluation of right third neuroma.  Much better since injection last visit.  It is still causing pain though.  He is wondering about another injection.  He has been wearing wider shoes.  He still getting numbness.  He denies any increased deformity weakness ecchymosis.    4/13/21   patient returns for evaluation of right third neuroma.  Injection last visit really did not help.  He has done shoe modifications.  He has tried padding this.  It is slowly getting worse.  Is painful on a daily basis.  Pain aggravated by activity and relieved by rest.  Would like to discuss surgery today since it very bothersome and conservative treatments have not worked.    ROS:  See above        No Known Allergies    No current outpatient medications on file.       Patient Active Problem List   Diagnosis     CARDIOVASCULAR SCREENING; LDL GOAL LESS THAN 160     NO ACTIVE PROBLEMS (aka NONE)     Onychomycosis     Sleep disturbances     Elevated blood pressure reading without diagnosis of hypertension       Past Medical History:   Diagnosis Date     CARDIOVASCULAR SCREENING; LDL GOAL LESS THAN 160 6/18/2013     NO ACTIVE PROBLEMS (aka NONE)        Past Surgical History:   Procedure Laterality Date     COLONOSCOPY WITH CO2 INSUFFLATION N/A 12/19/2014    Procedure: COLONOSCOPY WITH CO2 INSUFFLATION;  Surgeon:  "Kalpana Chen MD;  Location: [facility]     ENT SURGERY      Tonsillectomy     NO HISTORY OF SURGERY         Family History   Problem Relation Age of Onset     C.A.D. Father          from an MI at 57       Social History     Tobacco Use     Smoking status: Never Smoker     Smokeless tobacco: Never Used   Substance Use Topics     Alcohol use: Yes     Comment: 4 beers over the weekend         Exam:    Vitals: BP (!) 159/99   Pulse 62   Ht 1.778 m (5' 10\")   Wt 77.1 kg (170 lb)   BMI 24.39 kg/m    BMI: Body mass index is 24.39 kg/m .  Height: 5' 10\"    Constitutional/ general:  Pt is in no apparent distress, appears well-nourished.  Cooperative with history and physical exam.     Psych:  The patient answered questions appropriately.  Normal affect.  Seems to have reasonable expectations, in terms of treatment.     Eyes:  Visual scanning/ tracking without deficit.     Ears:  Response to auditory stimuli is normal.  negative hearing aid devices.  Auricles in proper alignment.     Lymphatic:  Popliteal lymph nodes not enlarged.     Lungs:  Non labored breathing, non labored speech. No cough.  No audible wheezing. Even, quiet breathing.       Vascular:  positive pedal pulses bilaterally for both the DP and PT arteries.  CFT < 3 sec.  negative ankle edema.  positive pedal hair growth.    Neuro:  Alert and oriented x 3. Coordinated gait.  Light touch sensation is intact to the L4, L5, S1 distributions. No obvious deficits.  No evidence of neurological-based weakness, spasticity, or contracture in the lower extremities.      Derm: Normal texture and turgor.  No erythema, ecchymosis, or cyanosis.      Musculoskeletal:     Patient is ambulatory without an assistive device or brace.   Normal arch with weightbearing.  No forefoot or rear foot deformities noted.  MS 5/5 all compartments.  Normal ROM all fore foot and rearfoot joints.  No equinus.   Pain upon palpation to the right third intermetatarsal space.    Positive " rachel's sign noted.  No erythema or subcutaneous masses noted.  No pain on the met heads or dorsal on the metatarsal necks.  Negative Tinnel's sign.    Radiographic Exam:  X-Ray Findings:  I personally reviewed the films.  Normal    Assessment: Neuroma third Intermetatarsal Space right foot    Plan:  Disscussed etiology and treatment options at great detail.  Patient would like to discuss surgery today.  This would be same-day surgery.  He would have a plantar incision.  He would have to be nonweightbearing for 3 weeks.  We gave him crutches today.  We discussed complications.  There is a 20% chance of stump neuroma.  Other complications include numbness infection continued pain in the area.  Discussed chance of hypertrophic incision.  Patient would like to do this as soon as possible.  He is very active and he would like to enjoy the summer.  Wrote an order to have surgery on April 27, 2020 1 in the morning.  He will call me if he has any other questions.        Robert Jacobson, ANEESH MELENDREZ, FACFAS

## 2021-04-14 NOTE — TELEPHONE ENCOUNTER
Type of surgery: RIGHT FOOT NEUROMA REMOVAL RIGHT   CPT 24065   Neuroma D36.10    Location of surgery: [facility]  Date and time of surgery: 04/27/2021  Surgeon: GISEL  Pre-Op Appt Date: 04/23/2021  Post-Op Appt Date: 04/30/2021   Packet sent out: Yes  Pre-cert/Authorization completed:  No prior auth needed per [email or web address] online site.  Date: 4-14-21    Apryl Guzmán  Prior Authorization Dept  [phone]

## 2021-04-15 ENCOUNTER — IMMUNIZATION (OUTPATIENT)
Dept: NURSING | Facility: CLINIC | Age: 61
End: 2021-04-15
Payer: COMMERCIAL

## 2021-04-15 PROCEDURE — 91300 PR COVID VAC PFIZER DIL RECON 30 MCG/0.3 ML IM: CPT

## 2021-04-15 PROCEDURE — 0001A PR COVID VAC PFIZER DIL RECON 30 MCG/0.3 ML IM: CPT

## 2021-04-16 ENCOUNTER — TELEPHONE (OUTPATIENT)
Dept: PODIATRY | Facility: CLINIC | Age: 61
End: 2021-04-16

## 2021-04-16 NOTE — TELEPHONE ENCOUNTER
Set of crutches have been left at the [facility]  for pt.    Left message for pt     Renetta Whaley CMA

## 2021-04-21 RX ORDER — IBUPROFEN 200 MG
200 TABLET ORAL EVERY 4 HOURS PRN
COMMUNITY

## 2021-04-23 ENCOUNTER — OFFICE VISIT (OUTPATIENT)
Dept: FAMILY MEDICINE | Facility: CLINIC | Age: 61
End: 2021-04-23
Payer: COMMERCIAL

## 2021-04-23 ENCOUNTER — NURSE TRIAGE (OUTPATIENT)
Dept: NURSING | Facility: CLINIC | Age: 61
End: 2021-04-23

## 2021-04-23 VITALS
HEART RATE: 95 BPM | OXYGEN SATURATION: 96 % | TEMPERATURE: 98.5 F | HEIGHT: 69 IN | SYSTOLIC BLOOD PRESSURE: 144 MMHG | WEIGHT: 178 LBS | DIASTOLIC BLOOD PRESSURE: 98 MMHG | BODY MASS INDEX: 26.36 KG/M2

## 2021-04-23 DIAGNOSIS — D36.10 NEUROMA: ICD-10-CM

## 2021-04-23 DIAGNOSIS — Z13.220 LIPID SCREENING: ICD-10-CM

## 2021-04-23 DIAGNOSIS — I10 ESSENTIAL HYPERTENSION: ICD-10-CM

## 2021-04-23 DIAGNOSIS — Z01.818 PREOP GENERAL PHYSICAL EXAM: Primary | ICD-10-CM

## 2021-04-23 LAB
ANION GAP SERPL CALCULATED.3IONS-SCNC: 6 MMOL/L (ref 3–14)
BUN SERPL-MCNC: 15 MG/DL (ref 7–30)
CALCIUM SERPL-MCNC: 9.4 MG/DL (ref 8.5–10.1)
CHLORIDE SERPL-SCNC: 105 MMOL/L (ref 94–109)
CHOLEST SERPL-MCNC: 227 MG/DL
CO2 SERPL-SCNC: 29 MMOL/L (ref 20–32)
CREAT SERPL-MCNC: 0.96 MG/DL (ref 0.66–1.25)
GFR SERPL CREATININE-BSD FRML MDRD: 86 ML/MIN/{1.73_M2}
GLUCOSE SERPL-MCNC: 86 MG/DL (ref 70–99)
HBA1C MFR BLD: 5.1 % (ref 0–5.6)
HDLC SERPL-MCNC: 102 MG/DL
LDLC SERPL CALC-MCNC: 101 MG/DL
NONHDLC SERPL-MCNC: 125 MG/DL
POTASSIUM SERPL-SCNC: 3.9 MMOL/L (ref 3.4–5.3)
SODIUM SERPL-SCNC: 140 MMOL/L (ref 133–144)
TRIGL SERPL-MCNC: 120 MG/DL

## 2021-04-23 PROCEDURE — 83036 HEMOGLOBIN GLYCOSYLATED A1C: CPT | Performed by: PREVENTIVE MEDICINE

## 2021-04-23 PROCEDURE — 80061 LIPID PANEL: CPT | Performed by: PREVENTIVE MEDICINE

## 2021-04-23 PROCEDURE — 99214 OFFICE O/P EST MOD 30 MIN: CPT | Performed by: PREVENTIVE MEDICINE

## 2021-04-23 PROCEDURE — 36415 COLL VENOUS BLD VENIPUNCTURE: CPT | Performed by: PREVENTIVE MEDICINE

## 2021-04-23 PROCEDURE — 80048 BASIC METABOLIC PNL TOTAL CA: CPT | Performed by: PREVENTIVE MEDICINE

## 2021-04-23 RX ORDER — AMLODIPINE BESYLATE 5 MG/1
5 TABLET ORAL DAILY
Qty: 90 TABLET | Refills: 1 | Status: SHIPPED | OUTPATIENT
Start: 2021-04-23 | End: 2021-11-23

## 2021-04-23 ASSESSMENT — MIFFLIN-ST. JEOR: SCORE: 1604.84

## 2021-04-23 ASSESSMENT — PAIN SCALES - GENERAL: PAINLEVEL: MILD PAIN (3)

## 2021-04-23 NOTE — PROGRESS NOTES
[address] [address]  Phone: [phone]  Primary Provider: Colleen Cochran  Pre-op Performing Provider: CLEMENTE ALFONSO      PREOPERATIVE EVALUATION:  Today's date: 4/23/2021    Isacc Louis is a 60 year old male who presents for a preoperative evaluation.    Surgical Information:  Surgery/Procedure:Right foot neuroma removal  Surgery Location: [facility]  Surgeon: Robert Jacobson DPM  Surgery Date: 4/27/21  Time of Surgery: 10:30 am  Where patient plans to recover: At home with family  Fax number for surgical facility: Note does not need to be faxed, will be available electronically in Epic.    Type of Anesthesia Anticipated: Monitor Anesthesia care     Assessment & Plan     The proposed surgical procedure is considered INTERMEDIATE risk.    Preop general physical exam  -scheduled for surgery on 4/27/21    Neuroma  -Plan is for surgery     Essential hypertension  -New diagnosis  -has had high readings in the past   - amLODIPine (NORVASC) 5 MG tablet  Dispense: 90 tablet; Refill: 1  - Basic metabolic panel  - Hemoglobin A1c  - Albumin Random Urine Quantitative with Creat Ratio  -await labs  -will monitor blood pressure and update via My Chart     Lipid screening  - Lipid panel reflex to direct LDL Fasting          Medication Instructions:   - Calcium Channel Blockers: May be continued on the day of surgery.   - ibuprofen (Advil, Motrin): HOLD 1 day before surgery.     RECOMMENDATION:  APPROVAL GIVEN to proceed with proposed procedure, without further diagnostic evaluation.        25 minutes spent on the date of the encounter doing chart review, history and exam, documentation and further activities per the note        Subjective     HPI related to upcoming procedure: History of progressively worsening right foot pain secondary to a neuroma, not improved with conservative measures and cortisone injections.      Preop Questions  4/20/2021   1. Have you ever had a heart attack or stroke? No   2. Have you ever had surgery on your heart or blood vessels, such as a stent placement, a coronary artery bypass, or surgery on an artery in your head, neck, heart, or legs? No   3. Do you have chest pain with activity? No   4. Do you have a history of  heart failure? No   5. Do you currently have a cold, bronchitis or symptoms of other infection? No   6. Do you have a cough, shortness of breath, or wheezing? No   7. Do you or anyone in your family have previous history of blood clots? No   8. Do you or does anyone in your family have a serious bleeding problem such as prolonged bleeding following surgeries or cuts? No   9. Have you ever had problems with anemia or been told to take iron pills? No   10. Have you had any abnormal blood loss such as black, tarry or bloody stools? No   11. Have you ever had a blood transfusion? No   12. Are you willing to have a blood transfusion if it is medically needed before, during, or after your surgery? Yes   13. Have you or any of your relatives ever had problems with anesthesia? No   14. Do you have sleep apnea, excessive snoring or daytime drowsiness? No   15. Do you have any artifical heart valves or other implanted medical devices like a pacemaker, defibrillator, or continuous glucose monitor? No   16. Do you have artificial joints? No   17. Are you allergic to latex? No       Health Care Directive:  Patient does not have a Health Care Directive or Living Will: Discussed advance care planning with patient; however, patient declined at this time.    Preoperative Review of :   reviewed - no record of controlled substances prescribed.      Status of Chronic Conditions:  HYPERTENSION - Patient has a new diagnosis of HTN , currently denies any symptoms referable to elevated blood pressure. Specifically denies chest pain, palpitations, dyspnea, orthopnea, PND or peripheral edema. Blood pressure readings have  not been in normal range. Current medication regimen is as listed below. Patient will start using the medication     Review of Systems  CONSTITUTIONAL: NEGATIVE for fever, chills, change in weight  INTEGUMENTARY/SKIN: NEGATIVE for worrisome rashes, moles or lesions  EYES: NEGATIVE for vision changes or irritation  ENT/MOUTH: NEGATIVE for ear, mouth and throat problems  RESP: NEGATIVE for significant cough or SOB  BREAST: NEGATIVE for masses, tenderness or discharge  CV: NEGATIVE for chest pain, palpitations or peripheral edema  GI: NEGATIVE for nausea, abdominal pain, heartburn, or change in bowel habits  : NEGATIVE for frequency, dysuria, or hematuria  NEURO: NEGATIVE for weakness, dizziness or paresthesias  ENDOCRINE: NEGATIVE for temperature intolerance, skin/hair changes  HEME: NEGATIVE for bleeding problems  PSYCHIATRIC: NEGATIVE for changes in mood or affect    Patient Active Problem List    Diagnosis Date Noted     Neuroma 04/14/2021     Priority: Medium     Added automatically from request for surgery [id number]       Sleep disturbances 12/23/2016     Priority: Medium     Elevated blood pressure reading without diagnosis of hypertension 12/23/2016     Priority: Medium     Onychomycosis 04/29/2016     Priority: Medium     NO ACTIVE PROBLEMS (aka NONE)      Priority: Medium     CARDIOVASCULAR SCREENING; LDL GOAL LESS THAN 160 06/18/2013     Priority: Medium      Past Medical History:   Diagnosis Date     CARDIOVASCULAR SCREENING; LDL GOAL LESS THAN 160 6/18/2013     NO ACTIVE PROBLEMS (aka NONE)      Past Surgical History:   Procedure Laterality Date     COLONOSCOPY WITH CO2 INSUFFLATION N/A 12/19/2014    Procedure: COLONOSCOPY WITH CO2 INSUFFLATION;  Surgeon: Kalpana Chen MD;  Location: [facility]     ENT SURGERY      Tonsillectomy     NO HISTORY OF SURGERY       Current Outpatient Medications   Medication Sig Dispense Refill     amLODIPine (NORVASC) 5 MG tablet Take 1 tablet (5 mg) by mouth daily For high  "blood pressure 90 tablet 1     ibuprofen (ADVIL/MOTRIN) 200 MG tablet Take 200 mg by mouth every 4 hours as needed for mild pain         No Known Allergies     Social History     Tobacco Use     Smoking status: Never Smoker     Smokeless tobacco: Never Used   Substance Use Topics     Alcohol use: Yes     Comment: 4 beers over the weekend     Family History   Problem Relation Age of Onset     C.A.D. Father          from an MI at 57     History   Drug Use No         Objective     BP (!) 144/98   Pulse 95   Temp 98.5  F (36.9  C) (Tympanic)   Ht 1.74 m (5' 8.5\")   Wt 80.7 kg (178 lb)   SpO2 96%   BMI 26.67 kg/m      Physical Exam  GENERAL APPEARANCE: healthy, alert and no distress  EYES: Eyes grossly normal to inspection and conjunctivae and sclerae normal  NECK: no adenopathy and trachea midline and normal to palpation, no carotid bruits   RESP: lungs clear to auscultation - no rales, rhonchi or wheezes  CV: regular rates and rhythm, normal S1 S2, no S3 or S4 and no murmur, click or rub  ABDOMEN: soft, non-tender and no rebound or guarding   MS: extremities normal- no gross deformities noted and peripheral pulses normal  SKIN: no suspicious lesions or rashes  NEURO: Normal strength and tone, mentation intact and speech normal  PSYCH: mentation appears normal      Recent Labs   Lab Test 20  1448   HGB 15.7           Diagnostics:  Labs pending at this time.  Results will be reviewed when available.   No EKG required, no history of coronary heart disease, significant arrhythmia, peripheral arterial disease or other structural heart disease.    Revised Cardiac Risk Index (RCRI):  The patient has the following serious cardiovascular risks for perioperative complications:   - No serious cardiac risks = 0 points     RCRI Interpretation: 0 points: Class I (very low risk - 0.4% complication rate)           Signed Electronically by: Azucena Smith MD MPH    Copy of this evaluation report is provided to " requesting physician.

## 2021-04-23 NOTE — PATIENT INSTRUCTIONS
At [facility], we strive to deliver an exceptional experience to you, every time we see you. If you receive a survey, please complete it as we do value your feedback.  If you have MyChart, you can expect to receive results automatically within 24 hours of their completion.  Your provider will send a note interpreting your results as well.   If you do not have MyChart, you should receive your results in about a week by mail.    Your care team:                            Family Medicine Internal Medicine   MD Aric Sarmiento MD Shantel Branch-Fleming, MD Srinivasa Vaka, MD Katya Belousova, PAJOCELYNE Brooks, APRN CNP    Nick Alvarez, MD Pediatrics   Ata Nixon, PAJOCELYNE Miller, CNP MD Alva Blandon APRN CNP   MD Laurel Sheets MD Deborah Mielke, MD Kasey Cochran, APRN [facility]      Clinic hours: Monday - Thursday 7 am-6 pm; Fridays 7 am-5 pm.   Urgent care: Monday - Friday 10 am- 8 pm; Saturday and Sunday 9 am-5 pm.    Clinic: [phone]       [facility]: Monday - Thursday 8 am - 7 pm; Friday 8 am - 6 pm  [facility]: [phone]     Use [email or web address] for 24/7 diagnosis and treatment of dozens of conditions.  Preparing for Your Surgery  Getting started  A nurse will call you to review your health history and instructions. They will give you an arrival time based on your scheduled surgery time.  Please be ready to share the following:    Your doctor's clinic name and phone number    Your medical, surgical and anesthesia history    A list of allergies and sensitivities    A list of medicines, including herbal treatments and over-the-counter drugs    Whether the patient has a legal guardian (ask how to send us the papers in advance)  If you have a child who's having surgery, please ask for a copy of Preparing for Your Child's Surgery.    Preparing for  surgery    Within 30 days of surgery: Have a pre-op exam (sometimes called an H&P, or History and Physical). This can be done at a clinic or pre-operative center.  ? If you're having a , you may not need this exam. Talk to your care team    At your pre-op exam, talk to your care team about all medicines you take. If you need to stop any medicines before surgery, ask when to start taking them again.  ? We do this for your safety. Many medicines can make you bleed too much during surgery. Some change how well surgery (anesthesia) drugs work.    Call your insurance company to let them know you're having surgery. (If you don't have insurance, call [phone].)    Call your clinic if there's any change in your health. This includes signs of a cold or flu (sore throat, runny nose, cough, rash, fever). It also includes a scrape or scratch near the surgery site.    If you have questions on the day of surgery, call your hospital or surgery center.  Eating and drinking guidelines  For your safety: Unless your surgeon tells you otherwise, follow the guidelines below.    Eat and drink as usual until 8 hours before surgery. After that, no food or milk.    Drink clear liquids until 2 hours before surgery. These are liquids you can see through, like water, Gatorade and Propel Water. You may also have black coffee and tea (no cream or milk).    Nothing by mouth within 2 hours of surgery. This includes gum, candy and breath mints.    If you drink, stop drinking alcohol the night before surgery.    If your care team tells you to take medicine on the morning of surgery, it's okay to take it with a sip of water.  Preventing infection    Shower or bathe the night before and morning of your surgery. Follow the instructions your clinic gave you. (If no instructions, use regular soap.)    Don't shave or clip hair near your surgery site. We'll remove the hair if needed.    Don't smoke or vape the morning of surgery. You may chew  nicotine gum up to 2 hours before surgery. A nicotine patch is okay.  ? Note: Some surgeries require you to completely quit smoking and nicotine. Check with your surgeon.    Your care team will make every effort to keep you safe from infection. We will:  ? Clean our hands often with soap and water (or an alcohol-based hand rub).  ? Clean the skin at your surgery site with a special soap that kills germs.  ? Give you a special gown to keep you warm. (Cold raises the risk of infection.)  ? Wear special hair covers, masks, gowns and gloves during surgery.  ? Give antibiotic medicine, if prescribed. Not all surgeries need antibiotics.  What to bring on the day of surgery    Photo ID and insurance card    Copy of your health care directive, if you have one    Glasses and hearing aides (bring cases)  ? You can't wear contacts during surgery    Inhaler and eye drops, if you use them (tell us about these when you arrive)    CPAP machine or breathing device, if you use them    A few personal items, if spending the night    If you have . . .  ? A pacemaker or ICD (cardiac defibrillator): Bring the ID card.  ? An implanted stimulator: Bring the remote control.  ? A legal guardian: Bring a copy of the certified (court-stamped) guardianship papers.  Please remove any jewelry, including body piercings. Leave jewelry and other valuables at home.  If you're going home the day of surgery  Important: If you don't follow the rules below, we must cancel your surgery.     Arrange for someone to drive you home after surgery. You may not drive, take a taxi or take public transportation by yourself (unless you'll have local anesthesia only).    Arrange for a responsible adult to stay with you overnight. If you don't, we may keep you in the hospital overnight, and you may need to pay the costs yourself.  Questions?   If you have any questions for your care team, list them here:  _________________________________________________________________________________________________________________________________________________________________________________________________________________________________________________________________________________________________________________________  For informational purposes only. Not to replace the advice of your health care provider. Copyright   2019 [facility]. All rights reserved. Clinically reviewed by Ching Van MD. [employer] [id number] - REV .    Preparing for Your Surgery  Getting started  A nurse will call you to review your health history and instructions. They will give you an arrival time based on your scheduled surgery time.  Please be ready to share the following:    Your doctor's clinic name and phone number    Your medical, surgical and anesthesia history    A list of allergies and sensitivities    A list of medicines, including herbal treatments and over-the-counter drugs    Whether the patient has a legal guardian (ask how to send us the papers in advance)  If you have a child who's having surgery, please ask for a copy of Preparing for Your Child's Surgery.    Preparing for surgery    Within 30 days of surgery: Have a pre-op exam (sometimes called an H&P, or History and Physical). This can be done at a clinic or pre-operative center.  ? If you're having a , you may not need this exam. Talk to your care team    At your pre-op exam, talk to your care team about all medicines you take. If you need to stop any medicines before surgery, ask when to start taking them again.  ? We do this for your safety. Many medicines can make you bleed too much during surgery. Some change how well surgery (anesthesia) drugs work.    Call your insurance company to let them know you're having surgery. (If you don't have insurance, call [phone].)    Call your clinic if there's any change in your health. This includes  signs of a cold or flu (sore throat, runny nose, cough, rash, fever). It also includes a scrape or scratch near the surgery site.    If you have questions on the day of surgery, call your hospital or surgery center.  Eating and drinking guidelines  For your safety: Unless your surgeon tells you otherwise, follow the guidelines below.    Eat and drink as usual until 8 hours before surgery. After that, no food or milk.    Drink clear liquids until 2 hours before surgery. These are liquids you can see through, like water, Gatorade and Propel Water. You may also have black coffee and tea (no cream or milk).    Nothing by mouth within 2 hours of surgery. This includes gum, candy and breath mints.    If you drink, stop drinking alcohol the night before surgery.    If your care team tells you to take medicine on the morning of surgery, it's okay to take it with a sip of water.  Preventing infection    Shower or bathe the night before and morning of your surgery. Follow the instructions your clinic gave you. (If no instructions, use regular soap.)    Don't shave or clip hair near your surgery site. We'll remove the hair if needed.    Don't smoke or vape the morning of surgery. You may chew nicotine gum up to 2 hours before surgery. A nicotine patch is okay.  ? Note: Some surgeries require you to completely quit smoking and nicotine. Check with your surgeon.    Your care team will make every effort to keep you safe from infection. We will:  ? Clean our hands often with soap and water (or an alcohol-based hand rub).  ? Clean the skin at your surgery site with a special soap that kills germs.  ? Give you a special gown to keep you warm. (Cold raises the risk of infection.)  ? Wear special hair covers, masks, gowns and gloves during surgery.  ? Give antibiotic medicine, if prescribed. Not all surgeries need antibiotics.  What to bring on the day of surgery    Photo ID and insurance card    Copy of your health care directive, if  you have one    Glasses and hearing aides (bring cases)  ? You can't wear contacts during surgery    Inhaler and eye drops, if you use them (tell us about these when you arrive)    CPAP machine or breathing device, if you use them    A few personal items, if spending the night    If you have . . .  ? A pacemaker or ICD (cardiac defibrillator): Bring the ID card.  ? An implanted stimulator: Bring the remote control.  ? A legal guardian: Bring a copy of the certified (court-stamped) guardianship papers.  Please remove any jewelry, including body piercings. Leave jewelry and other valuables at home.  If you're going home the day of surgery  Important: If you don't follow the rules below, we must cancel your surgery.     Arrange for someone to drive you home after surgery. You may not drive, take a taxi or take public transportation by yourself (unless you'll have local anesthesia only).    Arrange for a responsible adult to stay with you overnight. If you don't, we may keep you in the hospital overnight, and you may need to pay the costs yourself.  Questions?   If you have any questions for your care team, list them here: _________________________________________________________________________________________________________________________________________________________________________________________________________________________________________________________________________________________________________________________  For informational purposes only. Not to replace the advice of your health care provider. Copyright   2003, 2019 [facility]. All rights reserved. Clinically reviewed by Ching Van MD. [employer] [id number] - REV 4/20.    Preparing for Your Surgery  Getting started  A nurse will call you to review your health history and instructions. They will give you an arrival time based on your scheduled surgery time.  Please be ready to share the following:    Your doctor's clinic name and  phone number    Your medical, surgical and anesthesia history    A list of allergies and sensitivities    A list of medicines, including herbal treatments and over-the-counter drugs    Whether the patient has a legal guardian (ask how to send us the papers in advance)  If you have a child who's having surgery, please ask for a copy of Preparing for Your Child's Surgery.    Preparing for surgery    Within 30 days of surgery: Have a pre-op exam (sometimes called an H&P, or History and Physical). This can be done at a clinic or pre-operative center.  ? If you're having a , you may not need this exam. Talk to your care team    At your pre-op exam, talk to your care team about all medicines you take. If you need to stop any medicines before surgery, ask when to start taking them again.  ? We do this for your safety. Many medicines can make you bleed too much during surgery. Some change how well surgery (anesthesia) drugs work.    Call your insurance company to let them know you're having surgery. (If you don't have insurance, call [phone].)    Call your clinic if there's any change in your health. This includes signs of a cold or flu (sore throat, runny nose, cough, rash, fever). It also includes a scrape or scratch near the surgery site.    If you have questions on the day of surgery, call your hospital or surgery center.  Eating and drinking guidelines  For your safety: Unless your surgeon tells you otherwise, follow the guidelines below.    Eat and drink as usual until 8 hours before surgery. After that, no food or milk.    Drink clear liquids until 2 hours before surgery. These are liquids you can see through, like water, Gatorade and Propel Water. You may also have black coffee and tea (no cream or milk).    Nothing by mouth within 2 hours of surgery. This includes gum, candy and breath mints.    If you drink, stop drinking alcohol the night before surgery.    If your care team tells you to take  medicine on the morning of surgery, it's okay to take it with a sip of water.  Preventing infection    Shower or bathe the night before and morning of your surgery. Follow the instructions your clinic gave you. (If no instructions, use regular soap.)    Don't shave or clip hair near your surgery site. We'll remove the hair if needed.    Don't smoke or vape the morning of surgery. You may chew nicotine gum up to 2 hours before surgery. A nicotine patch is okay.  ? Note: Some surgeries require you to completely quit smoking and nicotine. Check with your surgeon.    Your care team will make every effort to keep you safe from infection. We will:  ? Clean our hands often with soap and water (or an alcohol-based hand rub).  ? Clean the skin at your surgery site with a special soap that kills germs.  ? Give you a special gown to keep you warm. (Cold raises the risk of infection.)  ? Wear special hair covers, masks, gowns and gloves during surgery.  ? Give antibiotic medicine, if prescribed. Not all surgeries need antibiotics.  What to bring on the day of surgery    Photo ID and insurance card    Copy of your health care directive, if you have one    Glasses and hearing aides (bring cases)  ? You can't wear contacts during surgery    Inhaler and eye drops, if you use them (tell us about these when you arrive)    CPAP machine or breathing device, if you use them    A few personal items, if spending the night    If you have . . .  ? A pacemaker or ICD (cardiac defibrillator): Bring the ID card.  ? An implanted stimulator: Bring the remote control.  ? A legal guardian: Bring a copy of the certified (court-stamped) guardianship papers.  Please remove any jewelry, including body piercings. Leave jewelry and other valuables at home.  If you're going home the day of surgery  Important: If you don't follow the rules below, we must cancel your surgery.     Arrange for someone to drive you home after surgery. You may not drive, take  a taxi or take public transportation by yourself (unless you'll have local anesthesia only).    Arrange for a responsible adult to stay with you overnight. If you don't, we may keep you in the hospital overnight, and you may need to pay the costs yourself.  Questions?   If you have any questions for your care team, list them here: _________________________________________________________________________________________________________________________________________________________________________________________________________________________________________________________________________________________________________________________  For informational purposes only. Not to replace the advice of your health care provider. Copyright   2003, 2019 [facility]. All rights reserved. Clinically reviewed by Ching Van MD. [employer] [id number] - REV 4/20.    Patient Education     Lowering Your Blood Pressure with DASH  What is the DASH eating plan?  DASH (Dietary Approaches to Stop Hypertension) can help you prevent or lower high blood pressure. This eating plan provides the nutrients that help lower blood pressure: potassium, magnesium, calcium, protein and fiber.   If not controlled, high blood pressure may lead to heart disease, stroke or blindness.  This eating plan is rich in:     Fruits and vegetables    Whole grains    Fat-free or low-fat milk products    Fish and poultry (chicken, turkey, etc.)    Beans, seeds and nuts.  This eating plan is low in:     Salt and sodium    Sugar, sweets and drinks with sugar    Red meat, saturated fat and trans fat.  Lifestyle changes  Besides a healthy eating plan, other steps are needed to help control high blood pressure.     Stay at a healthy weight.    Be active for at least 30 minutes on most days.    If you drink alcohol, have no more than two drinks per day (for men) or one per day (for women).    If you take blood pressure medicine, take it as  directed.  Losing weight with DASH  You can lose weight by eating fewer calories. It is best to take off pounds slowly over time. Talk to a dietitian about the best way to do this.  Staying active   To shed pounds, combine DASH with daily exercise. Start with a 15-minute walk each day. Slowly increase the time until you reach a total of 30 minutes on most days. To avoid weight gain, try for 60 minutes each day. Check with your doctor before starting any exercise program.  Tips for less sodium    Avoid processed foods. These may include baked goods, cereals, soy sauce and even antacids.    Cook with less salt. Don't bring the saltshaker to the table.    Season food with herbs, spices, lemon, lime, vinegar, wine and salt-free seasoning blends.    Use low-sodium canned vegetables or fruits.  Tips to ease the change  Take a week or two to slowly make changes to your diet.    Add a serving of vegetables at lunch one day. The next day, add a serving at dinner as well.    Add fruit to one meal or eat it as a snack.    Work up to three servings of fat-free and low-fat milk products each day.    If you eat large portions of meat, cut back by a third at each meal. The goal is to eat 6 oz (ounces) of meat or less per day.    Serve brown rice and whole-wheat bread and pasta.    Try casseroles and stir-jeffrey dishes that have less meat and more vegetables, grains or cooked dry beans.    Serve two or more meatless meals each week.    For snacks and desserts, eat foods low in fat, sodium and sugar, such as:  ? Unsalted rice cakes, nuts or seeds  ? Fresh fruits, raw vegetables and raisins  ? Fat-free, low-fat or frozen yogurt  ? Popcorn with no salt or butter.    If you have trouble digesting milk products, try lactose-free milk or take lactase pills.    If you have a nut allergy, eat seeds, beans, lentils or split peas.    Fruits, vegetables and whole grain foods are high in fiber. To avoid bloating and diarrhea (loose, watery  stools), increase these foods over several weeks.  The DASH eating plan  Use this chart to plan your meals. Or take it with you when you shop for food.   Food Group Servings per Day  Serving Size Examples    1,600 Calories 2,000 Calories 2,600 Calories      Grains  (whole wheat) 6 6 to 8 10 to 11   1 slice bread    1 oz dry cereal      cup cooked rice, pasta or cereal Whole-wheat bread and rolls, whole-wheat pasta, English muffin, heber bread, bagel, cereals, grits, oatmeal, brown rice, unsalted pretzels and popcorn   Vegetables  3 to 4 4 to 5 5 to 6   1 cup raw leafy vegetables      cup cut-up raw or cooked vegetable      cup vegetable juice Broccoli, carrots, collards, green beans, green peas, kale, lima beans, potatoes, spinach, squash, sweet potatoes, tomatoes   Fruits 4 4 to 5 5 to 6   1 medium fruit      cup dried fruit      cup fresh, frozen, or canned fruit      cup fruit juice Apples, apricots, bananas, dates, grapes, oranges, grapefruit, mangoes, melons, peaches, pineapples, raisins, strawberries, tangerines   Fat-free or   low-fat milk and milk products 2 to 3 2 to 3 3   1 cup milk or yogurt    1   oz cheese Fat-free or low-fat (1%) milk, buttermilk, cheese, regular or frozen yogurt   Lean meats, poultry and fish 3 to 6 6 or less 6   1 oz cooked meats, poultry (chicken, turkey) or fish    1 egg    2 egg whites Lean meats (trim away any fat, then broil, roast or poach); remove skin from poultry. Eggs are high in cholesterol, so limit egg yolks to four or less per week.   Nuts, seeds   and legumes 3 per week 4 to 5 per week 1 per day   ? cup (1   oz) nuts    2 Tbsp peanut butter    2 Tbsp (   oz) seeds      cup cooked legumes (dry beans and peas) Almonds, hazelnuts, mixed nuts, peanuts, walnuts, sunflower seeds, peanut butter, kidney beans, lentils, split peas   Fats and oils 2 2 to 3 3   1 tsp soft margarine    1 tsp vegetable oil    1 Tbsp tai    2 Tbsp salad dressing Soft margarine, vegetable oil (such  as canola, corn, olive or safflower), low-fat mayonnaise, light salad dressing   Sweets and   added sugars 0 5 or less per week 2 or less per day   1 Tbsp sugar, jelly or jam      cup sorbet or gelatin    1 cup lemonade Fruit-flavored gelatin, fruit punch, hard candy, jelly, maple syrup, sorbets and ices   A sample meal plan  This sample menu gives two sodium levels. Start with 2,300 mg of sodium (about 1 teaspoon of table salt per day). Then, try to lower your intake to 1,500 mg a day. Talk to your doctor or dietitian about how to do this.   These samples are for people who eat 2,000 calories per day. The less salt you eat, the more you may lower your blood pressure.   Menu for 2,300 mg sodium per day   Breakfast:   cup instant oatmeal, 1 mini whole-wheat bagel, 1 tablespoon peanut butter, 1 medium banana, 1 cup (8 ounces) low-fat milk.   Lunch: 1 cup cantaloupe chunks, 1 cup apple juice and one chicken breast sandwich that includes:    Two slices (3 ounces) chicken breast, no skin    Two slices whole-wheat bread    1 slice (   ounce) reduced-fat cheddar cheese    One leaf ruby lettuce    Two slices tomato    1 tablespoon low-fat mayonnaise.  Dinner: 1 cup cooked spaghetti,   cup low-salt vegetarian spaghetti sauce, 3 tablespoons Parmesan cheese;   cup corn (from frozen);   cup canned pears in juice; one spinach salad that includes:    1 cup fresh spinach leaves      cup fresh carrots, grated      cup fresh mushrooms, sliced    1 tablespoon vinegar and oil dressing.  Snacks:? cup unsalted almonds;   cup dried apricots; 1 cup fat-free fruit yogurt, no sugar added.  Reducing to 1,500 mg sodium per day  Use the same menu plan, but:    For breakfast, replace instant oatmeal with regular oatmeal and 1 teaspoon cinnamon.    For lunch, replace cheddar cheese with low-sodium Swiss cheese.  Resources on diet and health  [facility] [place]  [facility]  [address] [address]   [place],  "[place] [address]   Phone: [phone]   TTY: [phone]   www.nhlbi.nih.gov   \"Aim for a Healthy Weight\"   www.nhlbi.nih.gov/health/public/heart/obesity/lose_wt/index.htm  \"Dietary Guidelines for Americans 2005\"   and \"A Healthier You\"   www.healthierus.gov/dietaryguidelines  For informational purposes only. Not to replace the advice of your health care provider. Adapted from \"Your Guide to Lowering Blood Pressure with DASH,\" by the National [employer] [facility] [place],   NIH Publication No. , revised April 2006. Available at www.nhlbi.nih.gov/health/public/heart/hbp/dash/index.htm. Published by [facility]. [employer] 964679 - REV 09/15.  For informational purposes only. Not to replace the advice of your health care provider.  Copyright   2018 [facility] [employer]. All rights reserved.           "

## 2021-04-23 NOTE — TELEPHONE ENCOUNTER
Caller missed his  Covid pre procedure test today and needs it done tomorrow  for procedure on 04/27    Order is present in chart'   Call  transferred to Covid scheduling line   Madeline Lion RN  FNA       Reason for Disposition    COVID-19 Testing, questions about    Protocols used: CORONAVIRUS (COVID-19) DIAGNOSED OR [id number] 1.3

## 2021-04-23 NOTE — RESULT ENCOUNTER NOTE
Iscac,     Three month glucose number is normal, you do not have diabetes or pre diabetes. Other results are pending.     Please do not hesitate to call us at [phone] if you have any questions or concerns.    Thank you,    Azucena Smith MD MPH

## 2021-04-24 DIAGNOSIS — Z11.59 ENCOUNTER FOR SCREENING FOR OTHER VIRAL DISEASES: ICD-10-CM

## 2021-04-24 LAB
SARS-COV-2 RNA RESP QL NAA+PROBE: NORMAL
SPECIMEN SOURCE: NORMAL

## 2021-04-24 PROCEDURE — U0003 INFECTIOUS AGENT DETECTION BY NUCLEIC ACID (DNA OR RNA); SEVERE ACUTE RESPIRATORY SYNDROME CORONAVIRUS 2 (SARS-COV-2) (CORONAVIRUS DISEASE [COVID-19]), AMPLIFIED PROBE TECHNIQUE, MAKING USE OF HIGH THROUGHPUT TECHNOLOGIES AS DESCRIBED BY CMS-2020-01-R: HCPCS | Performed by: PODIATRIST

## 2021-04-24 PROCEDURE — U0005 INFEC AGEN DETEC AMPLI PROBE: HCPCS | Performed by: PODIATRIST

## 2021-04-25 LAB
LABORATORY COMMENT REPORT: NORMAL
SARS-COV-2 RNA RESP QL NAA+PROBE: NEGATIVE
SPECIMEN SOURCE: NORMAL

## 2021-04-26 ENCOUNTER — ANESTHESIA EVENT (OUTPATIENT)
Dept: SURGERY | Facility: AMBULATORY SURGERY CENTER | Age: 61
End: 2021-04-26

## 2021-04-27 ENCOUNTER — HOSPITAL ENCOUNTER (OUTPATIENT)
Facility: AMBULATORY SURGERY CENTER | Age: 61
Discharge: HOME | End: 2021-04-27
Attending: PODIATRIST | Admitting: PODIATRIST
Payer: COMMERCIAL

## 2021-04-27 ENCOUNTER — ANESTHESIA (OUTPATIENT)
Dept: SURGERY | Facility: AMBULATORY SURGERY CENTER | Age: 61
End: 2021-04-27

## 2021-04-27 VITALS
SYSTOLIC BLOOD PRESSURE: 134 MMHG | OXYGEN SATURATION: 97 % | DIASTOLIC BLOOD PRESSURE: 84 MMHG | RESPIRATION RATE: 18 BRPM | TEMPERATURE: 97.7 F

## 2021-04-27 DIAGNOSIS — D36.10 NEUROMA: ICD-10-CM

## 2021-04-27 PROCEDURE — 28080 REMOVAL OF FOOT LESION: CPT | Mod: RT | Performed by: PODIATRIST

## 2021-04-27 PROCEDURE — 28080 REMOVAL OF FOOT LESION: CPT | Mod: T7

## 2021-04-27 PROCEDURE — G8918 PT W/O PREOP ORDER IV AB PRO: HCPCS

## 2021-04-27 PROCEDURE — 88305 TISSUE EXAM BY PATHOLOGIST: CPT | Performed by: PATHOLOGY

## 2021-04-27 PROCEDURE — G8907 PT DOC NO EVENTS ON DISCHARG: HCPCS

## 2021-04-27 PROCEDURE — G8916 PT W IV AB GIVEN ON TIME: HCPCS

## 2021-04-27 RX ORDER — ACETAMINOPHEN 325 MG/1
975 TABLET ORAL ONCE
Status: COMPLETED | OUTPATIENT
Start: 2021-04-27 | End: 2021-04-27

## 2021-04-27 RX ORDER — SODIUM CHLORIDE, SODIUM LACTATE, POTASSIUM CHLORIDE, CALCIUM CHLORIDE 600; 310; 30; 20 MG/100ML; MG/100ML; MG/100ML; MG/100ML
INJECTION, SOLUTION INTRAVENOUS CONTINUOUS
Status: DISCONTINUED | OUTPATIENT
Start: 2021-04-27 | End: 2021-04-28 | Stop reason: HOSPADM

## 2021-04-27 RX ORDER — OXYCODONE HYDROCHLORIDE 5 MG/1
5 TABLET ORAL EVERY 4 HOURS PRN
Status: DISCONTINUED | OUTPATIENT
Start: 2021-04-27 | End: 2021-04-28 | Stop reason: HOSPADM

## 2021-04-27 RX ORDER — CEFAZOLIN SODIUM 2 G/100ML
2 INJECTION, SOLUTION INTRAVENOUS
Status: COMPLETED | OUTPATIENT
Start: 2021-04-27 | End: 2021-04-27

## 2021-04-27 RX ORDER — FENTANYL CITRATE 50 UG/ML
INJECTION, SOLUTION INTRAMUSCULAR; INTRAVENOUS PRN
Status: DISCONTINUED | OUTPATIENT
Start: 2021-04-27 | End: 2021-04-27

## 2021-04-27 RX ORDER — FENTANYL CITRATE 50 UG/ML
25-50 INJECTION, SOLUTION INTRAMUSCULAR; INTRAVENOUS
Status: DISCONTINUED | OUTPATIENT
Start: 2021-04-27 | End: 2021-04-28 | Stop reason: HOSPADM

## 2021-04-27 RX ORDER — NALOXONE HYDROCHLORIDE 0.4 MG/ML
0.4 INJECTION, SOLUTION INTRAMUSCULAR; INTRAVENOUS; SUBCUTANEOUS
Status: DISCONTINUED | OUTPATIENT
Start: 2021-04-27 | End: 2021-04-28 | Stop reason: HOSPADM

## 2021-04-27 RX ORDER — NALOXONE HYDROCHLORIDE 0.4 MG/ML
0.2 INJECTION, SOLUTION INTRAMUSCULAR; INTRAVENOUS; SUBCUTANEOUS
Status: DISCONTINUED | OUTPATIENT
Start: 2021-04-27 | End: 2021-04-28 | Stop reason: HOSPADM

## 2021-04-27 RX ORDER — METOPROLOL TARTRATE 1 MG/ML
1-2 INJECTION, SOLUTION INTRAVENOUS EVERY 5 MIN PRN
Status: DISCONTINUED | OUTPATIENT
Start: 2021-04-27 | End: 2021-04-28 | Stop reason: HOSPADM

## 2021-04-27 RX ORDER — KETOROLAC TROMETHAMINE 30 MG/ML
INJECTION, SOLUTION INTRAMUSCULAR; INTRAVENOUS PRN
Status: DISCONTINUED | OUTPATIENT
Start: 2021-04-27 | End: 2021-04-27

## 2021-04-27 RX ORDER — PHYSOSTIGMINE SALICYLATE 1 MG/ML
1.2 INJECTION INTRAVENOUS
Status: DISCONTINUED | OUTPATIENT
Start: 2021-04-27 | End: 2021-04-28 | Stop reason: HOSPADM

## 2021-04-27 RX ORDER — PROPOFOL 10 MG/ML
INJECTION, EMULSION INTRAVENOUS PRN
Status: DISCONTINUED | OUTPATIENT
Start: 2021-04-27 | End: 2021-04-27

## 2021-04-27 RX ORDER — HYDROCODONE BITARTRATE AND ACETAMINOPHEN 5; 325 MG/1; MG/1
1-2 TABLET ORAL EVERY 4 HOURS PRN
Qty: 25 TABLET | Refills: 0 | Status: SHIPPED | OUTPATIENT
Start: 2021-04-27 | End: 2022-06-16

## 2021-04-27 RX ORDER — ONDANSETRON 2 MG/ML
4 INJECTION INTRAMUSCULAR; INTRAVENOUS EVERY 30 MIN PRN
Status: DISCONTINUED | OUTPATIENT
Start: 2021-04-27 | End: 2021-04-28 | Stop reason: HOSPADM

## 2021-04-27 RX ORDER — FENTANYL CITRATE 50 UG/ML
25-50 INJECTION, SOLUTION INTRAMUSCULAR; INTRAVENOUS EVERY 5 MIN PRN
Status: DISCONTINUED | OUTPATIENT
Start: 2021-04-27 | End: 2021-04-28 | Stop reason: HOSPADM

## 2021-04-27 RX ORDER — HYDRALAZINE HYDROCHLORIDE 20 MG/ML
2.5-5 INJECTION INTRAMUSCULAR; INTRAVENOUS EVERY 10 MIN PRN
Status: DISCONTINUED | OUTPATIENT
Start: 2021-04-27 | End: 2021-04-28 | Stop reason: HOSPADM

## 2021-04-27 RX ORDER — CEFAZOLIN SODIUM 2 G/100ML
2 INJECTION, SOLUTION INTRAVENOUS SEE ADMIN INSTRUCTIONS
Status: DISCONTINUED | OUTPATIENT
Start: 2021-04-27 | End: 2021-04-28 | Stop reason: HOSPADM

## 2021-04-27 RX ORDER — ALBUTEROL SULFATE 0.83 MG/ML
2.5 SOLUTION RESPIRATORY (INHALATION) EVERY 4 HOURS PRN
Status: DISCONTINUED | OUTPATIENT
Start: 2021-04-27 | End: 2021-04-28 | Stop reason: HOSPADM

## 2021-04-27 RX ORDER — LIDOCAINE 40 MG/G
CREAM TOPICAL
Status: DISCONTINUED | OUTPATIENT
Start: 2021-04-27 | End: 2021-04-28 | Stop reason: HOSPADM

## 2021-04-27 RX ORDER — LIDOCAINE HYDROCHLORIDE 20 MG/ML
INJECTION, SOLUTION INFILTRATION; PERINEURAL PRN
Status: DISCONTINUED | OUTPATIENT
Start: 2021-04-27 | End: 2021-04-27

## 2021-04-27 RX ORDER — BUPIVACAINE HYDROCHLORIDE 5 MG/ML
INJECTION, SOLUTION PERINEURAL PRN
Status: DISCONTINUED | OUTPATIENT
Start: 2021-04-27 | End: 2021-04-27 | Stop reason: HOSPADM

## 2021-04-27 RX ORDER — PROPOFOL 10 MG/ML
INJECTION, EMULSION INTRAVENOUS CONTINUOUS PRN
Status: DISCONTINUED | OUTPATIENT
Start: 2021-04-27 | End: 2021-04-27

## 2021-04-27 RX ORDER — ONDANSETRON 4 MG/1
4 TABLET, ORALLY DISINTEGRATING ORAL EVERY 30 MIN PRN
Status: DISCONTINUED | OUTPATIENT
Start: 2021-04-27 | End: 2021-04-28 | Stop reason: HOSPADM

## 2021-04-27 RX ORDER — MEPERIDINE HYDROCHLORIDE 25 MG/ML
12.5 INJECTION INTRAMUSCULAR; INTRAVENOUS; SUBCUTANEOUS
Status: DISCONTINUED | OUTPATIENT
Start: 2021-04-27 | End: 2021-04-28 | Stop reason: HOSPADM

## 2021-04-27 RX ORDER — ONDANSETRON 2 MG/ML
INJECTION INTRAMUSCULAR; INTRAVENOUS PRN
Status: DISCONTINUED | OUTPATIENT
Start: 2021-04-27 | End: 2021-04-27

## 2021-04-27 RX ADMIN — KETOROLAC TROMETHAMINE 30 MG: 30 INJECTION, SOLUTION INTRAMUSCULAR; INTRAVENOUS at 10:52

## 2021-04-27 RX ADMIN — ACETAMINOPHEN 975 MG: 325 TABLET ORAL at 09:40

## 2021-04-27 RX ADMIN — LIDOCAINE HYDROCHLORIDE 60 MG: 20 INJECTION, SOLUTION INFILTRATION; PERINEURAL at 10:22

## 2021-04-27 RX ADMIN — PROPOFOL 150 MCG/KG/MIN: 10 INJECTION, EMULSION INTRAVENOUS at 10:22

## 2021-04-27 RX ADMIN — PROPOFOL 100 MG: 10 INJECTION, EMULSION INTRAVENOUS at 10:22

## 2021-04-27 RX ADMIN — ONDANSETRON 4 MG: 2 INJECTION INTRAMUSCULAR; INTRAVENOUS at 10:52

## 2021-04-27 RX ADMIN — SODIUM CHLORIDE, SODIUM LACTATE, POTASSIUM CHLORIDE, CALCIUM CHLORIDE: 600; 310; 30; 20 INJECTION, SOLUTION INTRAVENOUS at 10:20

## 2021-04-27 RX ADMIN — CEFAZOLIN SODIUM 2 G: 2 INJECTION, SOLUTION INTRAVENOUS at 10:22

## 2021-04-27 RX ADMIN — FENTANYL CITRATE 50 MCG: 50 INJECTION, SOLUTION INTRAMUSCULAR; INTRAVENOUS at 10:22

## 2021-04-27 NOTE — RESULT ENCOUNTER NOTE
Isacc, your test results were within normal limits.  Electrolytes, glucose and kidney function are normal.  LDL cholesterol is at goal for you.     Please do not hesitate to call us at [phone] if you have any questions or concerns.    Thank you,    Azucena Smith MD MPH

## 2021-04-27 NOTE — ANESTHESIA PREPROCEDURE EVALUATION
Anesthesia Pre-Procedure Evaluation    Patient: Isacc Louis   MRN: [id number] : 1960        Preoperative Diagnosis: Neuroma [D36.10]   Procedure : Procedure(s):  Right foot neuroma removal     Past Medical History:   Diagnosis Date     CARDIOVASCULAR SCREENING; LDL GOAL LESS THAN 160 2013     NO ACTIVE PROBLEMS (aka NONE)       Past Surgical History:   Procedure Laterality Date     COLONOSCOPY WITH CO2 INSUFFLATION N/A 2014    Procedure: COLONOSCOPY WITH CO2 INSUFFLATION;  Surgeon: Kalpana Chen MD;  Location: [facility] OR     ENT SURGERY      Tonsillectomy     NO HISTORY OF SURGERY        No Known Allergies   Social History     Tobacco Use     Smoking status: Never Smoker     Smokeless tobacco: Never Used   Substance Use Topics     Alcohol use: Yes     Comment: 4 beers over the weekend      Wt Readings from Last 1 Encounters:   21 80.7 kg (178 lb)        Anesthesia Evaluation   Pt has had prior anesthetic.     No history of anesthetic complications       ROS/MED HX  ENT/Pulmonary:  - neg pulmonary ROS     Neurologic: Comment: FOOT NEUROMA      Cardiovascular:     (+) hypertension-----    METS/Exercise Tolerance:     Hematologic:  - neg hematologic  ROS     Musculoskeletal:  - neg musculoskeletal ROS     GI/Hepatic:  - neg GI/hepatic ROS     Renal/Genitourinary:  - neg Renal ROS     Endo:  - neg endo ROS     Psychiatric/Substance Use:  - neg psychiatric ROS     Infectious Disease:  - neg infectious disease ROS     Malignancy:  - neg malignancy ROS     Other: Comment: Sleep disturbances           Physical Exam    Airway  airway exam normal      Mallampati: I   TM distance: > 3 FB   Neck ROM: full   Mouth opening: > 3 cm    Respiratory Devices and Support         Dental  no notable dental history         Cardiovascular   cardiovascular exam normal       Rhythm and rate: regular and normal     Pulmonary   pulmonary exam normal                OUTSIDE LABS:  CBC:   Lab Results   Component  Value Date    WBC 5.3 07/22/2020    HGB 15.7 07/22/2020    HCT 46.0 07/22/2020     07/22/2020     BMP:   Lab Results   Component Value Date     04/23/2021     11/12/2014    POTASSIUM 3.9 04/23/2021    POTASSIUM 4.2 11/12/2014    CHLORIDE 105 04/23/2021    CHLORIDE 105 11/12/2014    CO2 29 04/23/2021    CO2 31 11/12/2014    BUN 15 04/23/2021    BUN 15 11/12/2014    CR 0.96 04/23/2021    CR 0.86 11/12/2014    GLC 86 04/23/2021     (H) 11/12/2014     COAGS: No results found for: PTT, INR, FIBR  POC: No results found for: BGM, HCG, HCGS  HEPATIC:   Lab Results   Component Value Date    ALBUMIN 3.9 11/12/2014    PROTTOTAL 7.9 11/12/2014    ALT 27 11/12/2014    AST 15 11/12/2014    ALKPHOS 90 11/12/2014    BILITOTAL 0.5 11/12/2014     OTHER:   Lab Results   Component Value Date    A1C 5.1 04/23/2021    LEON 9.4 04/23/2021       Anesthesia Plan    ASA Status:  2   NPO Status:  NPO Appropriate    Anesthesia Type: MAC.     - Reason for MAC: straight local not clinically adequate   Induction: Intravenous.   Maintenance: TIVA.        Consents    Anesthesia Plan(s) and associated risks, benefits, and realistic alternatives discussed. Questions answered and patient/representative(s) expressed understanding.     - Discussed with:  Patient      - Extended Intubation/Ventilatory Support Discussed: No.      - Patient is DNR/DNI Status: No    Use of blood products discussed: No .     Postoperative Care    Pain management: IV analgesics, Oral pain medications.   PONV prophylaxis: Ondansetron (or other 5HT-3), Background Propofol Infusion     Comments:    MAC, sedation, GA as back up, standard ASA monitors  All pertinent results and records reviewed, risks, included but not limited to hypoventilation, hypoxemia, laryngo/bronchospasm, N/V, intraoperative awareness due to sedation only d/w patient, all questions, concerns addressed            Ke Guido MD

## 2021-04-27 NOTE — BRIEF OP NOTE
[facility] Brief Operative Note    Pre-operative diagnosis: Right foot Neuroma [D36.10]   Post-operative diagnosis same   Procedure: Procedure(s):  Right foot neuroma removal   Surgeon(s): Surgeon(s) and Role:     * Robert Jacobson DPM - Primary   Estimated blood loss: 2 mL    Specimens: ID Type Source Tests Collected by Time Destination   A : right foot neuroma Tissue Foot, Right SURGICAL PATHOLOGY EXAM Robert Jacobson DPM 4/27/2021 10:48 AM       Findings: neuroma

## 2021-04-27 NOTE — DISCHARGE INSTRUCTIONS
[facility]  Same-Day Surgery   Adult Discharge Orders & Instructions   For 24 hours after surgery  1. Get plenty of rest.  A responsible adult must stay with you for at least 24 hours after you leave the hospital.   2. Do not drive or use heavy equipment.  If you have weakness or tingling, don't drive or use heavy equipment until this feeling goes away.  3. Do not drink alcohol.  4. Avoid strenuous or risky activities.  Ask for help when climbing stairs.   5. You may feel lightheaded.  IF so, sit for a few minutes before standing.  Have someone help you get up.   6. If you have nausea (feel sick to your stomach): Drink only clear liquids such as apple juice, ginger ale, broth or 7-Up.  Rest may also help.  Be sure to drink enough fluids.  Move to a regular diet as you feel able.  7. You may have a slight fever. Call the doctor if your fever is over 100 F (37.7 C) (taken under the tongue) or lasts longer than 24 hours.  8. You may have a dry mouth, a sore throat, muscle aches or trouble sleeping.  These should go away after 24 hours.  9. Do not make important or legal decisions.   Call your doctor for any of the followin.  Signs of infection (fever, growing tenderness at the surgery site, a large amount of drainage or bleeding, severe pain, foul-smelling drainage, redness, swelling).    2. It has been over 8 to 10 hours since surgery and you are still not able to urinate (pass water).    3.  Headache for over 24 hours.    4.  Numbness, tingling or weakness the day after surgery (if you had spinal anesthesia).  To contact a doctor, call ___________________________

## 2021-04-27 NOTE — ANESTHESIA POSTPROCEDURE EVALUATION
Patient: Isacc Louis    Procedure(s):  Right foot neuroma removal    Diagnosis:Neuroma [D36.10]  Diagnosis Additional Information: No value filed.    Anesthesia Type:  MAC    Note:  Disposition: Outpatient   Postop Pain Control: Uneventful            Sign Out: Well controlled pain   PONV:    Neuro/Psych: Uneventful            Sign Out: Acceptable/Baseline neuro status   Airway/Respiratory: Uneventful            Sign Out: Acceptable/Baseline resp. status   CV/Hemodynamics: Uneventful            Sign Out: Acceptable CV status; No obvious hypovolemia; No obvious fluid overload   Other NRE:    DID A NON-ROUTINE EVENT OCCUR?            Last vitals:  Vitals:    04/27/21 0936 04/27/21 1105 04/27/21 1120   BP: (!) 166/99 118/77 134/84   Resp: 18 18 18   Temp: 36.9  C (98.4  F) 36.5  C (97.7  F)    SpO2: 98% 98% 97%       Last vitals prior to Anesthesia Care Transfer:  CRNA VITALS  4/27/2021 1033 - 4/27/2021 1133      4/27/2021             Resp Rate (observed):  (!) 1          Electronically Signed By: Ke Guido MD  April 27, 2021  11:55 AM

## 2021-04-28 NOTE — OP NOTE
Procedure Date: 04/27/2021    PREOPERATIVE DIAGNOSIS:  Right third interspace neuroma.    POSTOPERATIVE DIAGNOSIS:  Right third interspace neuroma.    PROCEDURE:  Right third interspace neuroma removal.    ANESTHESIA:  MAC.    HEMOSTASIS:  Pneumatic ankle tourniquet at 250 mmHg.    INDICATIONS:  This patient has a painful neuroma.  Now elects to have surgical removal.  He understands the possible complications include stump neuroma, numbness, infection, continued pain, and painful scar.    DESCRIPTION OF PROCEDURE:  The patient was brought to the operating table in a supine position.  He was given sedation by the anesthesiologist and the right third interspace was blocked.  Foot was then prepped and draped in normal sterile manner.  Pneumatic ankle tourniquet was placed around the ankle with copious amounts of Webril padding.  Foot was then elevated for complete exsanguination.  The tourniquet inflated.  The foot was brought on the operating room table where the following procedure was performed.  At this time, an incision was made between the third and fourth metatarsal head plantar and extended distally in the third interspace.  This was brought down to the deep fascia utilizing blunt and sharp dissection techniques.  All neurovascular structures that were encountered were either Bovie, tied, or retracted to the side.  We dissected down through the fat and immediately came upon a fusiform mass consistent with a neuroma.  We followed this out distally to the arms going to the third and fourth toe.  These were cut.  We gently resected the neuroma proximally.  We resected this back until healthy looking nerve was noted and we cut this as proximal as possible.  We then flushed the wound.  We inspected for any more abnormal debris and none was noted.  We dressed this with Adaptic, 4 x 4s, Roney, Kerlix and Coban.  Tourniquet was deflated.  All digits were noted to have preoperative levels of  perfusion.    COMPLICATIONS:  None.    ESTIMATED BLOOD LOSS:  1 mL.    The patient tolerated the procedure and anesthesia well.  He was then wheeled from the operating room to the recovery room with vital signs stable and intact sterile dressing.    Robert Jacobson DPM        D: 2021   T: 2021   MT: TED    Name:     HÉCTOR MALLORY  MRN:      [id number]        Account:        [id number]   :      1960           Procedure Date: 2021     Document: [id number]

## 2021-04-29 LAB — COPATH REPORT: NORMAL

## 2021-04-30 ENCOUNTER — OFFICE VISIT (OUTPATIENT)
Dept: PODIATRY | Facility: CLINIC | Age: 61
End: 2021-04-30
Payer: COMMERCIAL

## 2021-04-30 DIAGNOSIS — D36.10 NEUROMA: Primary | ICD-10-CM

## 2021-04-30 PROCEDURE — 99024 POSTOP FOLLOW-UP VISIT: CPT | Performed by: PODIATRIST

## 2021-04-30 NOTE — PATIENT INSTRUCTIONS
We wish you continued good healing. If you have any questions or concerns, please do not hesitate to contact us at [phone]    [employer] (secure e-mail communication and access to your chart) to send a message or to make an appointment.    Please remember to call and schedule a follow up appointment if one was recommended at your earliest convenience.     +++OF MARCH 2020+++ LOCATION AND HOURS HAVE CHANGED    PLEASE CALL CLINICS TO VERIFY DAYS AND TIMES  PODIATRY CLINIC HOURS  TELEPHONE NUMBER    Dr. Robert RENEEPMEAGAN [facility]        Clinics:  [facility]   Tuesday 1PM-6PM  [place]  Wednesday 745AM-330PM  [place]  Thursday/Friday 745AM-230PM  [address] APPOINTMENTS  [phone]    [place] APPOINTMENTS  [phone]          If you need a medication refill, please contact us you may need lab work and/or a follow up visit prior to your refill (i.e. Antifungal medications).    If MRI needed please call Imaging at [phone] or [phone]    HOW DO I GET MY KNEE SCOOTER? Knee scooters can be picked up at ANY Medical Supply stores with your knee scooter Prescription.  OR    Bring your signed prescription to an [facility] showroom.

## 2021-04-30 NOTE — LETTER
4/30/2021         RE: Isacc Louis  [address] [address]        Dear Colleague,    Thank you for referring your patient, Isacc Louis, to the [facility]. Please see a copy of my visit note below.    Subjective:    Patient is 3 days post op right neuroma removal done on 4/27/21.  Patient is doing well, admits compliance, denies fevers, chills, nausea or vomiting.  Pain slowly decreasing.  Patient has been nonweightbearing    Objective:    Dressing is dry, upon removal wound is well coapted, sutures are intact. Pulses are palpable, sensation to light touch is intact.  Normal postopp edema.  No erythema ecchymosis noted.    Patient Name: ISACC LOUIS   MR#: [id number]   Specimen #: [id number]   Collected: 4/27/2021   Received: 4/28/2021   Reported: 4/29/2021 16:36   Ordering Phy(s): BOOGIE JACOBSON     For improved result formatting, select 'View Enhanced Report Format' under    Linked Documents section.     SPECIMEN(S):   Right foot neuroma     FINAL DIAGNOSIS:   Right foot soft tissue excision:   - Consistent with neuroma   - Negative for malignancy     Assessment: postopp day 3 neuroma removal.    Plan:  Sterile redress and instructions to keep dressing on and dry.  Limit activities with minimal weightbearing.  Call with questions or concerns and follow-up on 5/18/21.    Boogie Jacobson DPM, FACFAS          Again, thank you for allowing me to participate in the care of your patient.        Sincerely,        Boogie Jacobson DPM

## 2021-04-30 NOTE — PROGRESS NOTES
Subjective:    Patient is 3 days post op right neuroma removal done on 4/27/21.  Patient is doing well, admits compliance, denies fevers, chills, nausea or vomiting.  Pain slowly decreasing.  Patient has been nonweightbearing    Objective:    Dressing is dry, upon removal wound is well coapted, sutures are intact. Pulses are palpable, sensation to light touch is intact.  Normal postopp edema.  No erythema ecchymosis noted.    Patient Name: HÉCTOR MALLORY   MR#: [id number]   Specimen #: [id number]   Collected: 4/27/2021   Received: 4/28/2021   Reported: 4/29/2021 16:36   Ordering Phy(s): BOOGIE JACOBSON     For improved result formatting, select 'View Enhanced Report Format' under    Linked Documents section.     SPECIMEN(S):   Right foot neuroma     FINAL DIAGNOSIS:   Right foot soft tissue excision:   - Consistent with neuroma   - Negative for malignancy     Assessment: postopp day 3 neuroma removal.    Plan:  Sterile redress and instructions to keep dressing on and dry.  Limit activities with minimal weightbearing.  Call with questions or concerns and follow-up on 5/18/21.    Boogie Jacobson DPM, FACFAS

## 2021-05-04 ENCOUNTER — MYC MEDICAL ADVICE (OUTPATIENT)
Dept: FAMILY MEDICINE | Facility: CLINIC | Age: 61
End: 2021-05-04

## 2021-05-06 ENCOUNTER — IMMUNIZATION (OUTPATIENT)
Dept: NURSING | Facility: CLINIC | Age: 61
End: 2021-05-06
Attending: INTERNAL MEDICINE
Payer: COMMERCIAL

## 2021-05-06 PROCEDURE — 91300 PR COVID VAC PFIZER DIL RECON 30 MCG/0.3 ML IM: CPT

## 2021-05-06 PROCEDURE — 0002A PR COVID VAC PFIZER DIL RECON 30 MCG/0.3 ML IM: CPT

## 2021-05-20 ENCOUNTER — OFFICE VISIT (OUTPATIENT)
Dept: PODIATRY | Facility: CLINIC | Age: 61
End: 2021-05-20
Payer: COMMERCIAL

## 2021-05-20 DIAGNOSIS — D36.10 NEUROMA: Primary | ICD-10-CM

## 2021-05-20 PROCEDURE — 99024 POSTOP FOLLOW-UP VISIT: CPT | Performed by: PODIATRIST

## 2021-05-20 NOTE — PROGRESS NOTES
Subjective:    Patient is 3 weeks 2 days post op right neuroma removal done on 4/27/21.  Patient is doing well, admits compliance, denies fevers, chills, nausea or vomiting.  Pain slowly decreasing.  Patient has been nonweightbearing.  Denies erythema or drainage    Objective:    Dressing is dry, upon removal wound is well coapted, sutures are intact.  With suture removal wound shows no dehiscence.  Pulses are palpable, sensation to light touch is intact.  Normal postopp edema.  No erythema ecchymosis noted.    Patient Name: HÉCTOR MALLORY   MR#: [id number]   Specimen #: [id number]   Collected: 4/27/2021   Received: 4/28/2021   Reported: 4/29/2021 16:36   Ordering Phy(s): BOOGIE JACOBSON     For improved result formatting, select 'View Enhanced Report Format' under    Linked Documents section.     SPECIMEN(S):   Right foot neuroma     FINAL DIAGNOSIS:   Right foot soft tissue excision:   - Consistent with neuroma   - Negative for malignancy     Assessment: postopp  neuroma removal.    Plan: Sutures removed.  To soften incision patient will get silicone sheeting.  He can gently massage to break up scar tissue but not too hard.  Slight slowly graduate from nonweightbearing to putting pressure on foot.  Patient will call me if he is having any other problems.  RTC as needed    Boogie Jacobson DPM, AMBROSIO

## 2021-05-20 NOTE — LETTER
5/20/2021         RE: Isacc Louis  [address] [address]        Dear Colleague,    Thank you for referring your patient, Isacc Louis, to the [facility]. Please see a copy of my visit note below.    Subjective:    Patient is 3 weeks 2 days post op right neuroma removal done on 4/27/21.  Patient is doing well, admits compliance, denies fevers, chills, nausea or vomiting.  Pain slowly decreasing.  Patient has been nonweightbearing.  Denies erythema or drainage    Objective:    Dressing is dry, upon removal wound is well coapted, sutures are intact.  With suture removal wound shows no dehiscence.  Pulses are palpable, sensation to light touch is intact.  Normal postopp edema.  No erythema ecchymosis noted.    Patient Name: ISACC LOUIS   MR#: [id number]   Specimen #: [id number]   Collected: 4/27/2021   Received: 4/28/2021   Reported: 4/29/2021 16:36   Ordering Phy(s): BOOGIE JACOBSON     For improved result formatting, select 'View Enhanced Report Format' under    Linked Documents section.     SPECIMEN(S):   Right foot neuroma     FINAL DIAGNOSIS:   Right foot soft tissue excision:   - Consistent with neuroma   - Negative for malignancy     Assessment: postopp  neuroma removal.    Plan: Sutures removed.  To soften incision patient will get silicone sheeting.  He can gently massage to break up scar tissue but not too hard.  Slight slowly graduate from nonweightbearing to putting pressure on foot.  Patient will call me if he is having any other problems.  RTC as needed    Boogie Jacobson DPM, FACFAS          Again, thank you for allowing me to participate in the care of your patient.        Sincerely,        Boogie Jacobson DPM

## 2021-09-25 ENCOUNTER — HEALTH MAINTENANCE LETTER (OUTPATIENT)
Age: 61
End: 2021-09-25

## 2022-05-07 ENCOUNTER — HEALTH MAINTENANCE LETTER (OUTPATIENT)
Age: 62
End: 2022-05-07

## 2022-06-16 ENCOUNTER — VIRTUAL VISIT (OUTPATIENT)
Dept: FAMILY MEDICINE | Facility: CLINIC | Age: 62
End: 2022-06-16
Payer: COMMERCIAL

## 2022-06-16 DIAGNOSIS — I10 ESSENTIAL HYPERTENSION: Primary | ICD-10-CM

## 2022-06-16 DIAGNOSIS — Z12.5 SCREENING FOR PROSTATE CANCER: ICD-10-CM

## 2022-06-16 PROCEDURE — 99213 OFFICE O/P EST LOW 20 MIN: CPT | Mod: GT | Performed by: NURSE PRACTITIONER

## 2022-06-16 RX ORDER — AMLODIPINE BESYLATE 5 MG/1
TABLET ORAL
Qty: 90 TABLET | Refills: 3 | Status: SHIPPED | OUTPATIENT
Start: 2022-06-16 | End: 2024-05-08

## 2022-06-16 NOTE — NURSING NOTE
Pt states younger brother had a heart attack with stints put in. Was not able to update Family History.

## 2022-06-16 NOTE — PROGRESS NOTES
Edward is a 61 year old who is being evaluated via a billable video visit.      How would you like to obtain your AVS? MyChart  If the video visit is dropped, the invitation should be resent by: Text to cell phone: [phone]  Will anyone else be joining your video visit? No          Assessment & Plan     Essential hypertension  Controlled, continue Norvasc 5 mg daily, low salt diet, regular exercise.   - Basic metabolic panel  (Ca, Cl, CO2, Creat, Gluc, K, Na, BUN)  - Albumin Random Urine Quantitative with Creat Ratio  - Home Blood Pressure Monitor Order for DME - ONLY FOR DME    Screening for prostate cancer  Last PSA 2014 1.31.  - PSA, screen      Ordering of each unique test  Prescription drug management  14  minutes spent on the date of the encounter doing chart review, history and exam, documentation and further activities per the note       Regular exercise  See Patient Instructions    No follow-ups on file.    NIDA Braxton [facility]    Subjective   Edward is a 61 year old  presenting for the following health issues:  No chief complaint on file.      History of Present Illness       Hypertension: He presents for follow up of hypertension.  He does not check blood pressure  regularly outside of the clinic. Outpatient blood pressures have not been over 140/90. He does not follow a low salt diet.       He is also due for Prostate screen- no nocturia, no change in urine stream, no   ED symptoms.      Review of Systems   Constitutional, HEENT, cardiovascular, pulmonary, gi and gu systems are negative, except as otherwise noted.      Objective           Vitals:  No vitals were obtained today due to virtual visit.    Physical Exam   GENERAL: Healthy, alert and no distress  EYES: Eyes grossly normal to inspection.  No discharge or erythema, or obvious scleral/conjunctival abnormalities.  HENT: Normal cephalic/atraumatic.  External ears, nose and mouth without ulcers or  lesions.  No nasal drainage visible.  NECK: No asymmetry, visible masses or scars  RESP: No audible wheeze, cough, or visible cyanosis.  No visible retractions or increased work of breathing.    SKIN: Visible skin clear. No significant rash, abnormal pigmentation or lesions.  NEURO: Cranial nerves grossly intact.  Mentation and speech appropriate for age.  PSYCH: Mentation appears normal, affect normal/bright, judgement and insight intact, normal speech and appearance well-groomed.          Video-Visit Details    Video Start Time: 2:10    Type of service:  Video Visit    Video End Time:2:20 PM    Originating Location (pt. Location): Home    Distant Location (provider location):  [facility]     Platform used for Video Visit: [employer]    .  ..

## 2022-07-19 ENCOUNTER — LAB (OUTPATIENT)
Dept: LAB | Facility: CLINIC | Age: 62
End: 2022-07-19
Payer: COMMERCIAL

## 2022-07-19 DIAGNOSIS — Z11.4 SCREENING FOR HIV (HUMAN IMMUNODEFICIENCY VIRUS): Primary | ICD-10-CM

## 2022-07-19 DIAGNOSIS — I10 ESSENTIAL HYPERTENSION: ICD-10-CM

## 2022-07-19 DIAGNOSIS — Z12.5 SCREENING FOR PROSTATE CANCER: ICD-10-CM

## 2022-07-19 LAB
ANION GAP SERPL CALCULATED.3IONS-SCNC: 10 MMOL/L (ref 3–14)
BUN SERPL-MCNC: 16 MG/DL (ref 7–30)
CALCIUM SERPL-MCNC: 9.1 MG/DL (ref 8.5–10.1)
CHLORIDE BLD-SCNC: 105 MMOL/L (ref 94–109)
CO2 SERPL-SCNC: 26 MMOL/L (ref 20–32)
CREAT SERPL-MCNC: 0.73 MG/DL (ref 0.66–1.25)
CREAT UR-MCNC: 161 MG/DL
GFR SERPL CREATININE-BSD FRML MDRD: >90 ML/MIN/1.73M2
GLUCOSE BLD-MCNC: 101 MG/DL (ref 70–99)
HIV 1+2 AB+HIV1 P24 AG SERPL QL IA: NONREACTIVE
MICROALBUMIN UR-MCNC: 11 MG/L
MICROALBUMIN/CREAT UR: 6.83 MG/G CR (ref 0–17)
POTASSIUM BLD-SCNC: 3.5 MMOL/L (ref 3.4–5.3)
PSA SERPL-MCNC: 2.23 UG/L (ref 0–4)
SODIUM SERPL-SCNC: 141 MMOL/L (ref 133–144)

## 2022-07-19 PROCEDURE — 87389 HIV-1 AG W/HIV-1&-2 AB AG IA: CPT

## 2022-07-19 PROCEDURE — 36415 COLL VENOUS BLD VENIPUNCTURE: CPT

## 2022-07-19 PROCEDURE — 82043 UR ALBUMIN QUANTITATIVE: CPT

## 2022-07-19 PROCEDURE — G0103 PSA SCREENING: HCPCS

## 2022-07-19 PROCEDURE — 80048 BASIC METABOLIC PNL TOTAL CA: CPT

## 2022-09-13 ENCOUNTER — TELEPHONE (OUTPATIENT)
Dept: FAMILY MEDICINE | Facility: CLINIC | Age: 62
End: 2022-09-13
Payer: COMMERCIAL

## 2022-09-13 NOTE — TELEPHONE ENCOUNTER
Patient Quality Outreach    Patient is due for the following:   Hypertension -  BP check    Next Steps:   Schedule a nurse only visit for Blood pressure check    Type of outreach:    Sent AFTER-MOUSE message.      Questions for provider review:    None     Kavitha Iglesias MA

## 2022-09-22 ENCOUNTER — VIRTUAL VISIT (OUTPATIENT)
Dept: FAMILY MEDICINE | Facility: CLINIC | Age: 62
End: 2022-09-22
Payer: COMMERCIAL

## 2022-09-22 DIAGNOSIS — H04.123 DRY EYES: Primary | ICD-10-CM

## 2022-09-22 DIAGNOSIS — I10 ESSENTIAL HYPERTENSION: ICD-10-CM

## 2022-09-22 PROCEDURE — 99214 OFFICE O/P EST MOD 30 MIN: CPT | Mod: GT | Performed by: NURSE PRACTITIONER

## 2022-09-22 NOTE — PROGRESS NOTES
Edward is a 61 year old who is being evaluated via a billable video visit.    How would you like to obtain your AVS? MyChart  If the video visit is dropped, the invitation should be resent by: Text to cell phone: [phone]  Will anyone else be joining your video visit? no        Assessment & Plan     Dry eyes  Advised use of Refresh ointment at HS in addition to Refresh drops during the day, will also refer to Optometry for consideration for other management (Restasis).  - Adult Eye  Referral    Essential hypertension  BP has been well controlled on 10 mg Amlodipine daily, continue current management.  - Home Blood Pressure Monitor Order for DME - ONLY FOR DME      Prescription drug management  19 minutes spent on the date of the encounter doing chart review, history and exam, documentation and further activities per the note       Regular exercise  See Patient Instructions    No follow-ups on file.    [facility]    Subjective   Edward is a 61 year old  presenting for the following health issues:  No chief complaint on file.      History of Present Illness       Reason for visit:  Dry eyes, causing discomfort.  Eye drops havent helped much.  Symptom onset:  More than a month  Symptoms include:  Burning eyes  Symptom intensity:  Moderate  Symptom progression:  Staying the same  What makes it worse:  I'm on the computer a lot for work.  I think that is effecting my eyes.   On the weekends, my eyes get better.  What makes it better:  Not looking at computer screens all day.    He eats 2-3 servings of fruits and vegetables daily.He consumes 0 sweetened beverage(s) daily.He exercises with enough effort to increase his heart rate 9 or less minutes per day.  He exercises with enough effort to increase his heart rate 3 or less days per week.   He is taking medications regularly.         Dry eyes- has had problems with dry eyes for several months, was seen by  [facility] and told to use Refresh drops which he's been using without improvement.  He works on a computer all day and notes that his symptoms worsen as the day progresses. He has new glasses as well.  No concerns for dry mouth or dry skin, no new medications, he nis not using decongestants or antihistamines.    Hypertension Follow-up      Do you check your blood pressure regularly outside of the clinic? No     Are you following a low salt diet? Yes    Are your blood pressures ever more than 140 on the top number (systolic) OR more   than 90 on the bottom number (diastolic), for example 140/90? No    Review of Systems   Constitutional, HEENT, cardiovascular, pulmonary, gi and gu systems are negative, except as otherwise noted.      Objective           Vitals:  No vitals were obtained today due to virtual visit.    Physical Exam   GENERAL: Healthy, alert and no distress  EYES: Eyes grossly normal to inspection.  No discharge or erythema, or obvious scleral/conjunctival abnormalities.  HENT: normal cephalic/atraumatic and nose and mouth without ulcers or lesions  RESP: No audible wheeze, cough, or visible cyanosis.  No visible retractions or increased work of breathing.    SKIN: Visible skin clear. No significant rash, abnormal pigmentation or lesions.  NEURO: Cranial nerves grossly intact.  Mentation and speech appropriate for age.  PSYCH: Mentation appears normal, affect normal/bright, judgement and insight intact, normal speech and appearance well-groomed.    Video-Visit Details    Video Start Time: 3:09    Type of service:  Video Visit    Video End Time:3:28 PM    Originating Location (pt. Location): Home    Distant Location (provider location):  [facility]     Platform used for Video Visit: Boqii

## 2023-01-07 ENCOUNTER — HEALTH MAINTENANCE LETTER (OUTPATIENT)
Age: 63
End: 2023-01-07

## 2023-06-02 ENCOUNTER — HEALTH MAINTENANCE LETTER (OUTPATIENT)
Age: 63
End: 2023-06-02

## 2024-04-17 ENCOUNTER — MYC MEDICAL ADVICE (OUTPATIENT)
Dept: FAMILY MEDICINE | Facility: CLINIC | Age: 64
End: 2024-04-17
Payer: COMMERCIAL

## 2024-04-23 ENCOUNTER — E-VISIT (OUTPATIENT)
Dept: FAMILY MEDICINE | Facility: CLINIC | Age: 64
End: 2024-04-23
Payer: COMMERCIAL

## 2024-04-23 DIAGNOSIS — M25.562 ACUTE PAIN OF LEFT KNEE: Primary | ICD-10-CM

## 2024-04-23 PROCEDURE — 99207 PR NON-BILLABLE SERV PER CHARTING: CPT | Performed by: NURSE PRACTITIONER

## 2024-04-29 NOTE — PATIENT INSTRUCTIONS
Thank you for choosing us for your care. I think an in-clinic visit would be best next steps based on your symptoms. Please schedule a clinic appointment; you won t be charged for this eVisit.      You can schedule an appointment right here in [facility], or call [phone]

## 2024-05-08 ENCOUNTER — ANCILLARY PROCEDURE (OUTPATIENT)
Dept: GENERAL RADIOLOGY | Facility: CLINIC | Age: 64
End: 2024-05-08
Attending: FAMILY MEDICINE
Payer: COMMERCIAL

## 2024-05-08 ENCOUNTER — OFFICE VISIT (OUTPATIENT)
Dept: FAMILY MEDICINE | Facility: CLINIC | Age: 64
End: 2024-05-08
Payer: COMMERCIAL

## 2024-05-08 VITALS
RESPIRATION RATE: 16 BRPM | HEART RATE: 74 BPM | WEIGHT: 163.5 LBS | BODY MASS INDEX: 25.66 KG/M2 | HEIGHT: 67 IN | OXYGEN SATURATION: 96 % | DIASTOLIC BLOOD PRESSURE: 92 MMHG | TEMPERATURE: 99.5 F | SYSTOLIC BLOOD PRESSURE: 158 MMHG

## 2024-05-08 DIAGNOSIS — M25.562 PAIN IN BOTH KNEES, UNSPECIFIED CHRONICITY: ICD-10-CM

## 2024-05-08 DIAGNOSIS — Z13.220 SCREENING CHOLESTEROL LEVEL: ICD-10-CM

## 2024-05-08 DIAGNOSIS — Z00.00 ENCOUNTER FOR WELLNESS EXAMINATION IN ADULT: Primary | ICD-10-CM

## 2024-05-08 DIAGNOSIS — M25.561 PAIN IN BOTH KNEES, UNSPECIFIED CHRONICITY: ICD-10-CM

## 2024-05-08 DIAGNOSIS — Z12.5 SCREENING FOR PROSTATE CANCER: ICD-10-CM

## 2024-05-08 DIAGNOSIS — I10 ESSENTIAL HYPERTENSION: ICD-10-CM

## 2024-05-08 PROCEDURE — G0103 PSA SCREENING: HCPCS | Performed by: FAMILY MEDICINE

## 2024-05-08 PROCEDURE — 99214 OFFICE O/P EST MOD 30 MIN: CPT | Mod: 25 | Performed by: FAMILY MEDICINE

## 2024-05-08 PROCEDURE — 36415 COLL VENOUS BLD VENIPUNCTURE: CPT | Performed by: FAMILY MEDICINE

## 2024-05-08 PROCEDURE — 80061 LIPID PANEL: CPT | Performed by: FAMILY MEDICINE

## 2024-05-08 PROCEDURE — 73562 X-RAY EXAM OF KNEE 3: CPT | Mod: TC | Performed by: RADIOLOGY

## 2024-05-08 PROCEDURE — 80053 COMPREHEN METABOLIC PANEL: CPT | Performed by: FAMILY MEDICINE

## 2024-05-08 PROCEDURE — 99396 PREV VISIT EST AGE 40-64: CPT | Performed by: FAMILY MEDICINE

## 2024-05-08 RX ORDER — LOSARTAN POTASSIUM 50 MG/1
50 TABLET ORAL DAILY
Qty: 30 TABLET | Refills: 1 | Status: SHIPPED | OUTPATIENT
Start: 2024-05-08

## 2024-05-08 SDOH — HEALTH STABILITY: PHYSICAL HEALTH: ON AVERAGE, HOW MANY MINUTES DO YOU ENGAGE IN EXERCISE AT THIS LEVEL?: 30 MIN

## 2024-05-08 SDOH — HEALTH STABILITY: PHYSICAL HEALTH: ON AVERAGE, HOW MANY DAYS PER WEEK DO YOU ENGAGE IN MODERATE TO STRENUOUS EXERCISE (LIKE A BRISK WALK)?: 6 DAYS

## 2024-05-08 ASSESSMENT — PAIN SCALES - GENERAL: PAINLEVEL: MODERATE PAIN (5)

## 2024-05-08 ASSESSMENT — SOCIAL DETERMINANTS OF HEALTH (SDOH): HOW OFTEN DO YOU GET TOGETHER WITH FRIENDS OR RELATIVES?: ONCE A WEEK

## 2024-05-08 NOTE — PROGRESS NOTES
"Preventive Care Visit  [facility]  Wilber Raymond DO, Family Medicine  May 8, 2024      Assessment & Plan     Encounter for wellness examination in adult  I encouraged him to eat a healthy low-sodium diet and get regular exercise like he has been.  We are checking nonfasting labs as listed below.    Pain in both knees, unspecified chronicity  I recommended that we check x-rays of the knees since he has been dealing with pain bilaterally since last summer.  He was also given a referral to see an orthopedic specialist to discuss further workup and treatment options based on these results.    I personally reviewed the x-ray images which show some mild joint space narrowing, especially in the right medial knee.  No fractures seen.  The formal radiology report is pending.    - XR Knee Bilateral 3 Views; Future  - Orthopedic  Referral; Future    Essential hypertension  I expressed my concern that his blood pressure is running high today.  We discussed options and decided to start losartan 50 mg daily.  He is to be on amlodipine 5 mg daily, but reports that his blood pressure was still running too high on this.  - Comprehensive metabolic panel (BMP + Alb, Alk Phos, ALT, AST, Total. Bili, TP); Future  - losartan (COZAAR) 50 MG tablet; Take 1 tablet (50 mg) by mouth daily    Screening cholesterol level  - Lipid Profile (Chol, Trig, HDL, LDL calc); Future    Screening for prostate cancer  - PSA, screen; Future    Patient has been advised of split billing requirements and indicates understanding: Yes          BMI  Estimated body mass index is 25.46 kg/m  as calculated from the following:    Height as of this encounter: 1.707 m (5' 7.2\").    Weight as of this encounter: 74.2 kg (163 lb 8 oz).   Weight management plan: Discussed healthy diet and exercise guidelines    Counseling  Appropriate preventive services were discussed with this patient, including applicable screening as appropriate " for fall prevention, nutrition, physical activity, Tobacco-use cessation, weight loss and cognition.  Checklist reviewing preventive services available has been given to the patient.  Reviewed patient's diet, addressing concerns and/or questions.   He is at risk for psychosocial distress and has been provided with information to reduce risk.           Too Leon is a 63 year old, presenting for the following:  Physical        5/8/2024     3:42 PM   Additional Questions   Roomed by Mandeep OROZCO    He has a medical history significant for hypertension.  He reports being on amlodipine for about a year, but decided to stop last fall after he retired.  He reports going to the dentist after stopping the medication and his blood pressure seemed good so he stayed off the amlodipine.      He describes having bilateral knee pain symptoms since hiking last summer in [place].  He denies any specific injury that may have caused this.  He denies catching or locking symptoms.  He denies having significant swelling. He denies having history of surgery or fracture that involves this area.              5/8/2024   General Health   How would you rate your overall physical health? Good   Feel stress (tense, anxious, or unable to sleep) Only a little   (!) STRESS CONCERN      5/8/2024   Nutrition   Three or more servings of calcium each day? (!) I DON'T KNOW   Diet: Regular (no restrictions)   How many servings of fruit and vegetables per day? (!) 2-3   How many sweetened beverages each day? 0-1         5/8/2024   Exercise   Days per week of moderate/strenous exercise 6 days   Average minutes spent exercising at this level 30 min         5/8/2024   Social Factors   Frequency of gathering with friends or relatives Once a week   Worry food won't last until get money to buy more No   Food not last or not have enough money for food? No   Do you have housing?  Yes   Are you worried about losing your housing? No   Lack of  "transportation? No   Unable to get utilities (heat,electricity)? No         5/8/2024   Fall Risk   Fallen 2 or more times in the past year? No   Trouble with walking or balance? Yes   Gait Speed Test (Document in seconds) 4.1   Gait Speed Test Interpretation Less than or equal to 5.00 seconds - PASS          5/8/2024   Dental   Dentist two times every year? Yes         5/8/2024   TB Screening   Were you born outside of the [place]? No         Today's PHQ-2 Score:       5/8/2024     3:48 PM   PHQ-2 ( 1999 [employer])   Q1: Little interest or pleasure in doing things 0   Q2: Feeling down, depressed or hopeless 0   PHQ-2 Score 0   Q1: Little interest or pleasure in doing things Not at all   Q2: Feeling down, depressed or hopeless Not at all   PHQ-2 Score 0           5/8/2024   Substance Use   Alcohol more than 3/day or more than 7/wk No   Do you use any other substances recreationally? (!) ALCOHOL     Social History     Tobacco Use    Smoking status: Never    Smokeless tobacco: Never   Substance Use Topics    Alcohol use: Yes     Comment: 4 beers over the weekend    Drug use: No           5/8/2024   STI Screening   New sexual partner(s) since last STI/HIV test? No   Last PSA:   PSA   Date Value Ref Range Status   11/12/2014 1.31 0 - 4 ug/L Final     Prostate Specific Antigen Screen   Date Value Ref Range Status   07/19/2022 2.23 0.00 - 4.00 ug/L Final     ASCVD Risk   The ASCVD Risk score (Jj CORDOVA, et al., 2019) failed to calculate for the following reasons:    The valid HDL cholesterol range is 20 to 100 mg/dL           Reviewed and updated as needed this visit by Provider     Meds                      Review of Systems  Constitutional, HEENT, cardiovascular, pulmonary, GI, , musculoskeletal, neuro, skin, endocrine and psych systems are negative, except as otherwise noted.     Objective    Exam  BP (!) 158/92   Pulse 74   Temp 99.5  F (37.5  C) (Temporal)   Resp 16   Ht 1.707 m (5' 7.2\")   Wt 74.2 kg (163 lb " "8 oz)   SpO2 96%   BMI 25.46 kg/m     Estimated body mass index is 25.46 kg/m  as calculated from the following:    Height as of this encounter: 1.707 m (5' 7.2\").    Weight as of this encounter: 74.2 kg (163 lb 8 oz).    Physical Exam  GENERAL: alert and no distress  EYES: Eyes grossly normal to inspection, PERRL and conjunctivae and sclerae normal  HENT: ear canals and TM's normal, nose and mouth without ulcers or lesions  NECK: no adenopathy, no asymmetry, masses, or scars  RESP: lungs clear to auscultation - no rales, rhonchi or wheezes  CV: regular rate and rhythm, normal S1 S2, no S3 or S4, no murmur, click or rub, no peripheral edema  ABDOMEN: soft, nontender, no hepatosplenomegaly, no masses and bowel sounds normal  MS: no gross musculoskeletal defects noted, no edema.  Internal and external hip rotation normal bilaterally.  Yenni's, varus, valgus, Lachman's and drawer testing negative bilaterally.  SKIN: no suspicious lesions or rashes  NEURO: Normal strength and tone, mentation intact and speech normal  PSYCH: mentation appears normal, affect normal/bright        Signed Electronically by: Wilber Raymond,     "

## 2024-05-09 LAB
ALBUMIN SERPL BCG-MCNC: 4.9 G/DL (ref 3.5–5.2)
ALP SERPL-CCNC: 70 U/L (ref 40–150)
ALT SERPL W P-5'-P-CCNC: 22 U/L (ref 0–70)
ANION GAP SERPL CALCULATED.3IONS-SCNC: 14 MMOL/L (ref 7–15)
AST SERPL W P-5'-P-CCNC: 25 U/L (ref 0–45)
BILIRUB SERPL-MCNC: 0.6 MG/DL
BUN SERPL-MCNC: 17.9 MG/DL (ref 8–23)
CALCIUM SERPL-MCNC: 9.7 MG/DL (ref 8.8–10.2)
CHLORIDE SERPL-SCNC: 101 MMOL/L (ref 98–107)
CHOLEST SERPL-MCNC: 265 MG/DL
CREAT SERPL-MCNC: 0.91 MG/DL (ref 0.67–1.17)
DEPRECATED HCO3 PLAS-SCNC: 25 MMOL/L (ref 22–29)
EGFRCR SERPLBLD CKD-EPI 2021: >90 ML/MIN/1.73M2
FASTING STATUS PATIENT QL REPORTED: NO
FASTING STATUS PATIENT QL REPORTED: NO
GLUCOSE SERPL-MCNC: 82 MG/DL (ref 70–99)
HDLC SERPL-MCNC: 128 MG/DL
LDLC SERPL CALC-MCNC: 125 MG/DL
NONHDLC SERPL-MCNC: 137 MG/DL
POTASSIUM SERPL-SCNC: 4.4 MMOL/L (ref 3.4–5.3)
PROT SERPL-MCNC: 7.9 G/DL (ref 6.4–8.3)
PSA SERPL DL<=0.01 NG/ML-MCNC: 1.79 NG/ML (ref 0–4.5)
SODIUM SERPL-SCNC: 140 MMOL/L (ref 135–145)
TRIGL SERPL-MCNC: 60 MG/DL

## 2024-06-05 NOTE — PROGRESS NOTES
CHIEF COMPLAINT:  No chief complaint on file.       HISTORY OF PRESENT ILLNESS  Mr. Louis is a pleasant 63 year old year old male who presents to clinic today with bilateral knee pain.  Isacc explains that he is having bilateral knee pain, mostly in the right knee.  Pain is mostly medial, and when hiking, his pain is anterior.  He is also a runner, and has stopped once the pain started, which was 3-4 years ago due to this pain.    Right >>left and notes he would not be here today if right knee was feeling better.      Onset: gradual  Location: bilateral knee  Quality:  dull at rest and sharp  Duration: 3-4 years   Severity: 7/10 at worst, 4/10 at rest  Timing:intermittent episodes increased pain with activity  Modifying factors:  resting and non-use makes it better, movement and use makes it worse  Associated signs & symptoms: pain  Previous similar pain: No  Treatments to date: ibuprofen    Additional history: as documented    Review of Systems:  A 10-point review of systems was obtained and is negative except for as noted in the HPI.     MEDICAL HISTORY  Patient Active Problem List   Diagnosis    CARDIOVASCULAR SCREENING; LDL GOAL LESS THAN 160    NO ACTIVE PROBLEMS (aka NONE)    Onychomycosis    Sleep disturbances    Neuroma       Current Outpatient Medications   Medication Sig Dispense Refill    ibuprofen (ADVIL/MOTRIN) 200 MG tablet Take 200 mg by mouth every 4 hours as needed for mild pain      losartan (COZAAR) 50 MG tablet Take 1 tablet (50 mg) by mouth daily 30 tablet 1       No Known Allergies    Family History   Problem Relation Age of Onset    C.A.D. Father          from an MI at 56    Myocardial Infarction Brother        Additional medical/Social/Surgical histories reviewed in [facility] and updated as appropriate.       PHYSICAL EXAM  There were no vitals taken for this visit.    General  - normal appearance, in no obvious distress  Musculoskeletal - right knee  - stance: normal gait without limp  -  inspection: trace effusion  - palpation: medial joint line tenderness  - ROM: 135 degrees flexion, -5 degrees extension, pain at terminal extension passively  - strength: 5/5 in flexion, 5/5 in extension  - special tests:  (-) Lachman  (-) anterior drawer  (+) Yenni  (-) varus at 0 and 30 degrees flexion  (-) valgus at 0 and 30 degrees flexion  Musculoskeletal - left knee  - stance: normal gait without limp  - inspection: no swelling or effusion, normal bone and joint alignment, no obvious deformity  - palpation: no joint line tenderness, patella and patellar tendon non-tender  - ROM: 135 degrees flexion, 0 degrees extension, not painful, normal actively and passively compared to contralateral  - strength: 5/5 in flexion, 5/5 in extension  - special tests:  (-) Lachman  (-) Yenni  (-) varus at 0 and 30 degrees flexion  (-) valgus at 0 and 30 degrees flexion  Neuro  - no sensory or motor deficit, grossly normal coordination, normal muscle tone  Skin  - no ecchymosis, erythema, warmth, or induration, no obvious rash    IMAGING :   EXAM: XR KNEE BILATERAL 3 VIEWS  LOCATION: [facility]  DATE: 5/8/2024     INDICATION:  Pain in both knees, unspecified chronicity, Pain in both knees, unspecified chronicity  COMPARISON: None.                                                                      IMPRESSION:   RIGHT KNEE: Normal joint spaces and alignment. No fracture or joint effusion.     LEFT KNEE: Normal joint spaces and alignment. No fracture or joint effusion.    XR bilateral knee 3 views. Final results and radiologist's interpretation, available in the [facility] health record. Images were reviewed with the patient/family members in the office today. My personal interpretation of the performed imaging is questionable mild medial joint space narrowing of right knee.  Left knee unremarkable.  Small patellar osteophyte noted at the lateral aspect of the left knee.     ASSESSMENT & PLAN  Mr. Louis is  a 63 year old year old male who presents to clinic today with acute on chronic right greater than left knee pain progressing over the past 4 years.    Diagnosis: Primary osteoarthritis of bilateral knees    Further diagnostic as well as treatment options were discussed.  Given persistent nature of his medial sided knee pain on the right knee as well as positive Yenni test I do have concern for a meniscus tear.  Additionally x-rays read as normal, therefore an MR is prudent to help us further investigate whether there is indeed osteoarthritis versus isolated meniscus pathology.  If meniscus pathology is noted without significant OA, we may have him see one of our knee sports surgeons to discuss arthroscopy.  If there is indeed osteoarthritic changes at the patellofemoral compartment or both in the medial compartment as well, we will discuss osteoarthritis management to include injections, physical therapy, bracing, oral and topical analgesics, as well as low back exercise.  I will see him back in person or virtually to discuss these MRI results when completed and we can update the treatment plan for him.    It was a pleasure seeing Isacc today.    Wilber Bennett DO, CAQSM  Primary Care Sports Medicine

## 2024-06-13 ENCOUNTER — OFFICE VISIT (OUTPATIENT)
Dept: ORTHOPEDICS | Facility: CLINIC | Age: 64
End: 2024-06-13
Payer: COMMERCIAL

## 2024-06-13 VITALS
SYSTOLIC BLOOD PRESSURE: 130 MMHG | DIASTOLIC BLOOD PRESSURE: 82 MMHG | HEIGHT: 67 IN | BODY MASS INDEX: 25.58 KG/M2 | WEIGHT: 163 LBS

## 2024-06-13 DIAGNOSIS — M25.562 CHRONIC PAIN OF LEFT KNEE: ICD-10-CM

## 2024-06-13 DIAGNOSIS — M25.561 CHRONIC PAIN OF RIGHT KNEE: Primary | ICD-10-CM

## 2024-06-13 DIAGNOSIS — G89.29 CHRONIC PAIN OF RIGHT KNEE: Primary | ICD-10-CM

## 2024-06-13 DIAGNOSIS — G89.29 CHRONIC PAIN OF LEFT KNEE: ICD-10-CM

## 2024-06-13 PROCEDURE — 99203 OFFICE O/P NEW LOW 30 MIN: CPT | Performed by: FAMILY MEDICINE

## 2024-06-13 NOTE — PATIENT INSTRUCTIONS
Thank you for choosing [facility]    DR BALTAZAR'S CLINIC LOCATIONS  [place] [address] [address] [facility], [address]th [place]   [place], [place], [address] [place] [address]   [phone]       APPOINTMENTS: [phone]    CARE QUESTIONS: [phone],    BILLING QUESTIONS: [phone]    FAX NUMBER: [phone]        Follow up: after MRI, in person or telephone visit.      1. Chronic pain of right knee              An order for an MRI was placed today. You may call directly to schedule at [phone] at your convenience.

## 2024-06-13 NOTE — LETTER
2024      Isacc Louis  [address] [address]      Dear Colleague,    Thank you for referring your patient, Isacc Louis, to the [facility] [place]. Please see a copy of my visit note below.    CHIEF COMPLAINT:  No chief complaint on file.       HISTORY OF PRESENT ILLNESS  Mr. Louis is a pleasant 63 year old year old male who presents to clinic today with bilateral knee pain.  Isacc explains that he is having bilateral knee pain, mostly in the right knee.  Pain is mostly medial, and when hiking, his pain is anterior.  He is also a runner, and has stopped once the pain started, which was 3-4 years ago due to this pain.    Right >>left and notes he would not be here today if right knee was feeling better.      Onset: gradual  Location: bilateral knee  Quality:  dull at rest and sharp  Duration: 3-4 years   Severity: 7/10 at worst, 4/10 at rest  Timing:intermittent episodes increased pain with activity  Modifying factors:  resting and non-use makes it better, movement and use makes it worse  Associated signs & symptoms: pain  Previous similar pain: No  Treatments to date: ibuprofen    Additional history: as documented    Review of Systems:  A 10-point review of systems was obtained and is negative except for as noted in the HPI.     MEDICAL HISTORY  Patient Active Problem List   Diagnosis     CARDIOVASCULAR SCREENING; LDL GOAL LESS THAN 160     NO ACTIVE PROBLEMS (aka NONE)     Onychomycosis     Sleep disturbances     Neuroma       Current Outpatient Medications   Medication Sig Dispense Refill     ibuprofen (ADVIL/MOTRIN) 200 MG tablet Take 200 mg by mouth every 4 hours as needed for mild pain       losartan (COZAAR) 50 MG tablet Take 1 tablet (50 mg) by mouth daily 30 tablet 1       No Known Allergies    Family History   Problem Relation Age of Onset     C.A.D. Father          from an MI at 56     Myocardial Infarction Brother         Additional medical/Social/Surgical histories reviewed in [facility] and updated as appropriate.       PHYSICAL EXAM  There were no vitals taken for this visit.    General  - normal appearance, in no obvious distress  Musculoskeletal - right knee  - stance: normal gait without limp  - inspection: trace effusion  - palpation: medial joint line tenderness  - ROM: 135 degrees flexion, -5 degrees extension, pain at terminal extension passively  - strength: 5/5 in flexion, 5/5 in extension  - special tests:  (-) Lachman  (-) anterior drawer  (+) Yenni  (-) varus at 0 and 30 degrees flexion  (-) valgus at 0 and 30 degrees flexion  Musculoskeletal - left knee  - stance: normal gait without limp  - inspection: no swelling or effusion, normal bone and joint alignment, no obvious deformity  - palpation: no joint line tenderness, patella and patellar tendon non-tender  - ROM: 135 degrees flexion, 0 degrees extension, not painful, normal actively and passively compared to contralateral  - strength: 5/5 in flexion, 5/5 in extension  - special tests:  (-) Lachman  (-) Yenni  (-) varus at 0 and 30 degrees flexion  (-) valgus at 0 and 30 degrees flexion  Neuro  - no sensory or motor deficit, grossly normal coordination, normal muscle tone  Skin  - no ecchymosis, erythema, warmth, or induration, no obvious rash    IMAGING :   EXAM: XR KNEE BILATERAL 3 VIEWS  LOCATION: [facility]  DATE: 5/8/2024     INDICATION:  Pain in both knees, unspecified chronicity, Pain in both knees, unspecified chronicity  COMPARISON: None.                                                                      IMPRESSION:   RIGHT KNEE: Normal joint spaces and alignment. No fracture or joint effusion.     LEFT KNEE: Normal joint spaces and alignment. No fracture or joint effusion.    XR bilateral knee 3 views. Final results and radiologist's interpretation, available in the [facility] health record. Images were reviewed with the  patient/family members in the office today. My personal interpretation of the performed imaging is questionable mild medial joint space narrowing of right knee.  Left knee unremarkable.  Small patellar osteophyte noted at the lateral aspect of the left knee.     ASSESSMENT & PLAN  Mr. Louis is a 63 year old year old male who presents to clinic today with acute on chronic right greater than left knee pain progressing over the past 4 years.    Diagnosis: Primary osteoarthritis of bilateral knees    Further diagnostic as well as treatment options were discussed.  Given persistent nature of his medial sided knee pain on the right knee as well as positive Yenni test I do have concern for a meniscus tear.  Additionally x-rays read as normal, therefore an MR is prudent to help us further investigate whether there is indeed osteoarthritis versus isolated meniscus pathology.  If meniscus pathology is noted without significant OA, we may have him see one of our knee sports surgeons to discuss arthroscopy.  If there is indeed osteoarthritic changes at the patellofemoral compartment or both in the medial compartment as well, we will discuss osteoarthritis management to include injections, physical therapy, bracing, oral and topical analgesics, as well as low back exercise.  I will see him back in person or virtually to discuss these MRI results when completed and we can update the treatment plan for him.    It was a pleasure seeing Isacc today.    Wilber Bennett DO, [facility]  Primary Care Sports Medicine       Again, thank you for allowing me to participate in the care of your patient.        Sincerely,        Wilber Bennett DO

## 2024-06-24 DIAGNOSIS — M25.562 CHRONIC PAIN OF LEFT KNEE: ICD-10-CM

## 2024-06-24 DIAGNOSIS — G89.29 CHRONIC PAIN OF LEFT KNEE: ICD-10-CM

## 2024-06-24 DIAGNOSIS — G89.29 CHRONIC PAIN OF RIGHT KNEE: Primary | ICD-10-CM

## 2024-06-24 DIAGNOSIS — M25.561 CHRONIC PAIN OF RIGHT KNEE: Primary | ICD-10-CM

## 2024-06-26 ENCOUNTER — THERAPY VISIT (OUTPATIENT)
Dept: PHYSICAL THERAPY | Facility: CLINIC | Age: 64
End: 2024-06-26
Attending: FAMILY MEDICINE
Payer: COMMERCIAL

## 2024-06-26 DIAGNOSIS — G89.29 CHRONIC PAIN OF RIGHT KNEE: ICD-10-CM

## 2024-06-26 DIAGNOSIS — M25.562 CHRONIC PAIN OF LEFT KNEE: ICD-10-CM

## 2024-06-26 DIAGNOSIS — G89.29 CHRONIC PAIN OF LEFT KNEE: ICD-10-CM

## 2024-06-26 DIAGNOSIS — M25.561 CHRONIC PAIN OF RIGHT KNEE: ICD-10-CM

## 2024-06-26 PROCEDURE — 97110 THERAPEUTIC EXERCISES: CPT | Mod: GP

## 2024-06-26 PROCEDURE — 97161 PT EVAL LOW COMPLEX 20 MIN: CPT | Mod: GP

## 2024-06-26 PROCEDURE — 97530 THERAPEUTIC ACTIVITIES: CPT | Mod: GP

## 2024-06-26 ASSESSMENT — ACTIVITIES OF DAILY LIVING (ADL)
GIVING WAY, BUCKLING OR SHIFTING OF KNEE: THE SYMPTOM AFFECTS MY ACTIVITY SLIGHTLY
HOW_WOULD_YOU_RATE_THE_OVERALL_FUNCTION_OF_YOUR_KNEE_DURING_YOUR_USUAL_DAILY_ACTIVITIES?: ABNORMAL
GO DOWN STAIRS: ACTIVITY IS SOMEWHAT DIFFICULT
AS_A_RESULT_OF_YOUR_KNEE_INJURY,_HOW_WOULD_YOU_RATE_YOUR_CURRENT_LEVEL_OF_DAILY_ACTIVITY?: ABNORMAL
RISE FROM A CHAIR: ACTIVITY IS SOMEWHAT DIFFICULT
GO DOWN STAIRS: ACTIVITY IS SOMEWHAT DIFFICULT
WALK: ACTIVITY IS SOMEWHAT DIFFICULT
STIFFNESS: THE SYMPTOM AFFECTS MY ACTIVITY SLIGHTLY
LIMPING: THE SYMPTOM AFFECTS MY ACTIVITY MODERATELY
SQUAT: ACTIVITY IS NOT DIFFICULT
HOW_WOULD_YOU_RATE_THE_CURRENT_FUNCTION_OF_YOUR_KNEE_DURING_YOUR_USUAL_DAILY_ACTIVITIES_ON_A_SCALE_FROM_0_TO_100_WITH_100_BEING_YOUR_LEVEL_OF_KNEE_FUNCTION_PRIOR_TO_YOUR_INJURY_AND_0_BEING_THE_INABILITY_TO_PERFORM_ANY_OF_YOUR_USUAL_DAILY_ACTIVITIES?: 35
STAND: ACTIVITY IS MINIMALLY DIFFICULT
GIVING WAY, BUCKLING OR SHIFTING OF KNEE: THE SYMPTOM AFFECTS MY ACTIVITY SLIGHTLY
AS_A_RESULT_OF_YOUR_KNEE_INJURY,_HOW_WOULD_YOU_RATE_YOUR_CURRENT_LEVEL_OF_DAILY_ACTIVITY?: ABNORMAL
PAIN: THE SYMPTOM AFFECTS MY ACTIVITY MODERATELY
KNEE_ACTIVITY_OF_DAILY_LIVING_SUM: 42
HOW_WOULD_YOU_RATE_THE_OVERALL_FUNCTION_OF_YOUR_KNEE_DURING_YOUR_USUAL_DAILY_ACTIVITIES?: ABNORMAL
STAND: ACTIVITY IS MINIMALLY DIFFICULT
WALK: ACTIVITY IS SOMEWHAT DIFFICULT
SWELLING: I DO NOT HAVE THE SYMPTOM
GO UP STAIRS: ACTIVITY IS SOMEWHAT DIFFICULT
RAW_SCORE: 42
SQUAT: ACTIVITY IS NOT DIFFICULT
STIFFNESS: THE SYMPTOM AFFECTS MY ACTIVITY SLIGHTLY
RISE FROM A CHAIR: ACTIVITY IS SOMEWHAT DIFFICULT
GO UP STAIRS: ACTIVITY IS SOMEWHAT DIFFICULT
SWELLING: I DO NOT HAVE THE SYMPTOM
WEAKNESS: THE SYMPTOM AFFECTS MY ACTIVITY MODERATELY
PLEASE_INDICATE_YOR_PRIMARY_REASON_FOR_REFERRAL_TO_THERAPY:: KNEE
HOW_WOULD_YOU_RATE_THE_CURRENT_FUNCTION_OF_YOUR_KNEE_DURING_YOUR_USUAL_DAILY_ACTIVITIES_ON_A_SCALE_FROM_0_TO_100_WITH_100_BEING_YOUR_LEVEL_OF_KNEE_FUNCTION_PRIOR_TO_YOUR_INJURY_AND_0_BEING_THE_INABILITY_TO_PERFORM_ANY_OF_YOUR_USUAL_DAILY_ACTIVITIES?: 35
SIT WITH YOUR KNEE BENT: ACTIVITY IS FAIRLY DIFFICULT
LIMPING: THE SYMPTOM AFFECTS MY ACTIVITY MODERATELY
KNEE_ACTIVITY_OF_DAILY_LIVING_SCORE: 60
KNEEL ON THE FRONT OF YOUR KNEE: ACTIVITY IS FAIRLY DIFFICULT
SIT WITH YOUR KNEE BENT: ACTIVITY IS FAIRLY DIFFICULT
KNEEL ON THE FRONT OF YOUR KNEE: ACTIVITY IS FAIRLY DIFFICULT
PAIN: THE SYMPTOM AFFECTS MY ACTIVITY MODERATELY
WEAKNESS: THE SYMPTOM AFFECTS MY ACTIVITY MODERATELY

## 2024-06-26 NOTE — PROGRESS NOTES
PHYSICAL THERAPY EVALUATION  Type of Visit: Evaluation       Fall Risk Screen:  Fall screen completed by: PT  Have you fallen 2 or more times in the past year?: No  Have you fallen and had an injury in the past year?: No  Is patient a fall risk?: No    Edward reports hx of R>L knee pain that has gradually worsened. Pain in L knee is very mild and not of a concern to him at this time. 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  Would love to hike, walk, and run w/out pain (3 miles a few times a wk). Insurance wants 6 wks of PT prior to covering MRI of the knee.  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    Prior diagnostic imaging/testing results: X-ray     Prior therapy history for the same diagnosis, illness or injury: No      Prior Level of Function  Transfers:   Ambulation:   ADL:   IADL:     Living Environment  Social support: With a significant other or spouse   Type of home: House; Multi-level; Basement   Stairs to enter the home: Yes 9 Is there a railing: Yes     Ramp: No   Stairs inside the home: Yes 9 Is there a railing: Yes     Help at home: Home management tasks (cooking, cleaning); Home and Yard maintenance tasks  Equipment owned: Crutches     Employment: Yes Part time as a [employer] Usher:   Lots of standing and walking  Hobbies/Interests: Yard work (mowing, watering, etc:), hunting, dog walking, biking    Patient goals for therapy: In a perfect world I could once again run.  I would love to be able to walk again w/out  pain.    Pain assessment: See objective evaluation for additional pain details     Objective   KNEE EVALUATION  PAIN: Pain Level at Rest: 2/10  Pain Level with Use: 8/10  Pain Quality: Sharp and Stabbing  Pain is Exacerbated By: walking, standing, use  Pain is Relieved By: cold, NSAIDs, and rest  Pain Progression: Worsened  INTEGUMENTARY (edema, incisions): WNL  POSTURE: WNL  GAIT:  Weightbearing Status: WBAT  Assistive Device(s): None  Gait Deviations: WNL  BALANCE/PROPRIOCEPTION: WNL  WEIGHTBEARING ALIGNMENT:   NON-WEIGHTBEARING ALIGNMENT:   ROM:   (Degrees) Left AROM Left PROM  Right AROM Right PROM   Knee Flexion 140  130, pain    Knee Extension 6  0    Pain:   End feel:     STRENGTH:   Pain: - none + mild ++ moderate +++ severe  Strength Scale: 0-5/5 Left Right   Knee Flexion 5 5   Knee Extension 5 4, ++ (mod)   Glute max 4 4   Iliopsoas 5 5     FLEXIBILITY: Decreased quadriceps L, Decreased hamstrings L, Decreased quadriceps R, Decreased hamstrings R  SPECIAL TESTS:    Left Right   Apley's (Meniscus) Negative  Positive   Yenni's (Meniscus) Negative  Negative    Chico's (ITB/TFL)     Patellar Apprehension Test     Patella Tracking     Ligamentous Stability     Anterior Drawer (ACL) Negative,   Negative,     Posterior Drawer (PCL) Negative,   Negative,     Prone Dial Test at 30 Deg and 90 Deg (PCL/PLC)     Valgus Stress Testing at 0 Deg and 30 Deg Negative,   Negative,     Varus Stress Testing at 0 Deg and 30 Deg  Negative,   Negative,       FUNCTIONAL TESTS: Double Leg Squat: Anterior knee translation and R knee pain  Single Leg Squat: Anterior knee translation, Knee valgus, Hip internal rotation, Improper use of glutes/hips, and R knee pain  PALPATION:   + Tenderness At Location Left Right   Medial Joint Line  +   Lateral Joint Line  -   Patellar Tendon  -   IT Band     Incisional     Popliteal  -   Biceps Femoris  -   Semitendinosis  -   Semimembranosis  -   Glut Medius     Patellar Medial  -   Patellar  Lateral  -   Patellar Superior  -   Patellar Inferior   -     JOINT MOBILITY:  NT    Assessment & Plan   CLINICAL IMPRESSIONS  Medical Diagnosis: Chronic pain of right knee  Chronic pain of left knee    Treatment Diagnosis: Chronic pain of right knee  Chronic pain of left knee   Impression/Assessment: Patient is a 63 year old male with R>L knee  complaints.  The following significant findings have been identified: Pain, Decreased ROM/flexibility, Decreased strength, Impaired muscle performance, and Decreased activity tolerance. These impairments interfere with their ability to perform self care tasks, work tasks, recreational activities, household chores, household mobility, and community mobility as compared to previous level of function.     Clinical Decision Making (Complexity):  Clinical Presentation: Stable/Uncomplicated  Clinical Presentation Rationale: based on medical and personal factors listed in PT evaluation  Clinical Decision Making (Complexity): Low complexity    PLAN OF CARE  Treatment Interventions:  Modalities: Cryotherapy, Dry Needling, E-stim, Vasoneumatic Device  Interventions: Gait Training, Manual Therapy, Neuromuscular Re-education, Therapeutic Activity, Therapeutic Exercise, Self-Care/Home Management    Long Term Goals     PT Goal 1  Goal Identifier: Walking  Goal Description: Pt will knee pain <3/10 following a 1 hour walk on any surface w/ elevation changes  Rationale: to maximize safety and independence with performance of ADLs and functional tasks;to maximize safety and independence within the home;to maximize safety and independence within the community;to maximize safety and independence with transportation;to maximize safety and independence with self cares  Target Date: 09/23/24  PT Goal 2  Goal Identifier: Stairs  Goal Description: Pt will report ability to navigate stairs for a shift at work w/ knee pain <3/10  Rationale: to maximize safety and independence with performance of ADLs  and functional tasks;to maximize safety and independence within the community;to maximize safety and independence within the home;to maximize safety and independence with transportation  Target Date: 09/23/24      Frequency of Treatment: 1x/wk  Duration of Treatment: 3 mo    Recommended Referrals to Other Professionals:   Education Assessment:   Learner/Method: Patient;Listening;Demonstration;No Barriers to Learning;Pictures/Video    Risks and benefits of evaluation/treatment have been explained.   Patient/Family/caregiver agrees with Plan of Care.     Evaluation Time:     PT Eval, Low Complexity Minutes (04501): 17       Signing Clinician: MARISOL RUBIO PT

## 2024-07-08 ENCOUNTER — THERAPY VISIT (OUTPATIENT)
Dept: PHYSICAL THERAPY | Facility: CLINIC | Age: 64
End: 2024-07-08
Attending: FAMILY MEDICINE
Payer: COMMERCIAL

## 2024-07-08 DIAGNOSIS — M25.561 CHRONIC PAIN OF RIGHT KNEE: ICD-10-CM

## 2024-07-08 DIAGNOSIS — M25.562 CHRONIC PAIN OF LEFT KNEE: Primary | ICD-10-CM

## 2024-07-08 DIAGNOSIS — G89.29 CHRONIC PAIN OF LEFT KNEE: Primary | ICD-10-CM

## 2024-07-08 DIAGNOSIS — G89.29 CHRONIC PAIN OF RIGHT KNEE: ICD-10-CM

## 2024-07-08 PROCEDURE — 97110 THERAPEUTIC EXERCISES: CPT | Mod: GP

## 2024-07-15 ENCOUNTER — OFFICE VISIT (OUTPATIENT)
Dept: FAMILY MEDICINE | Facility: CLINIC | Age: 64
End: 2024-07-15
Payer: COMMERCIAL

## 2024-07-15 VITALS
OXYGEN SATURATION: 99 % | TEMPERATURE: 96.9 F | BODY MASS INDEX: 25.8 KG/M2 | HEIGHT: 67 IN | WEIGHT: 164.4 LBS | RESPIRATION RATE: 16 BRPM | SYSTOLIC BLOOD PRESSURE: 136 MMHG | HEART RATE: 68 BPM | DIASTOLIC BLOOD PRESSURE: 86 MMHG

## 2024-07-15 DIAGNOSIS — I10 ESSENTIAL HYPERTENSION: Primary | ICD-10-CM

## 2024-07-15 PROCEDURE — 80048 BASIC METABOLIC PNL TOTAL CA: CPT | Performed by: FAMILY MEDICINE

## 2024-07-15 PROCEDURE — 36415 COLL VENOUS BLD VENIPUNCTURE: CPT | Performed by: FAMILY MEDICINE

## 2024-07-15 PROCEDURE — 99213 OFFICE O/P EST LOW 20 MIN: CPT | Performed by: FAMILY MEDICINE

## 2024-07-15 RX ORDER — LOSARTAN POTASSIUM 50 MG/1
50 TABLET ORAL DAILY
Qty: 30 TABLET | Refills: 11 | Status: CANCELLED | OUTPATIENT
Start: 2024-07-15

## 2024-07-15 RX ORDER — LOSARTAN POTASSIUM 100 MG/1
100 TABLET ORAL DAILY
Qty: 90 TABLET | Refills: 3 | Status: SHIPPED | OUTPATIENT
Start: 2024-07-15

## 2024-07-15 ASSESSMENT — PAIN SCALES - GENERAL: PAINLEVEL: NO PAIN (0)

## 2024-07-15 NOTE — PROGRESS NOTES
"  Assessment & Plan     Essential hypertension  His blood pressure has improved significantly since he was in for his physical exam in May, but it is still running borderline elevated.  I recommended that we increase the losartan dose to 100 mg daily.  He is going to work on the lifestyle modifications and start monitoring his blood pressure again more closely at home.  He will let me know if it remains elevated or if it drops too low.  We are going to check a BMP today.  - Basic metabolic panel  (Ca, Cl, CO2, Creat, Gluc, K, Na, BUN); Future  - losartan (COZAAR) 100 MG tablet; Take 1 tablet (100 mg) by mouth daily  - Basic metabolic panel  (Ca, Cl, CO2, Creat, Gluc, K, Na, BUN)          Subjective   Edward is a 63 year old, presenting for the following health issues:  Hypertension    History of Present Illness       Hypertension: He presents for follow up of hypertension.  He does not check blood pressure  regularly outside of the clinic. Outside blood pressures have been over 140/90. He follows a low salt diet.     He eats 2-3 servings of fruits and vegetables daily.He consumes 0 sweetened beverage(s) daily.He exercises with enough effort to increase his heart rate 9 or less minutes per day.  He exercises with enough effort to increase his heart rate 3 or less days per week. He is missing 1 dose(s) of medications per week.  He is not taking prescribed medications regularly due to remembering to take.     HISTORY:  Current types of exercise and amounts: Physical therapy regimen ongoing for knee. Weight lifting. Bicycling, road and stationary. Regular lawn work. Exercising about every other day.     Description of diet in general and salt content: Pt says \"I try to.\" He does not use added salt on foods at home.     Medication side effects: None    Most recent medication changes: Pt had been on amlodipine for about a year previously, but stopped the medication on his own after senior care. Pt started losartan 50mg on " "5/08/2024.    If applicable attempts at weight loss: N/A    If applicable attempts at smoking cessation: N/A    PLAN:  BP goal and home monitoring plan: Continue to take home BP once or more daily. Advised to avoid taking BP in first 30 minutes after     Exercise goals: Pt will continue current exercise regimen     Diet goals/changes, nutrition consult if desired: Pt increasing mindfulness about sodium content in foods. Educational materials provided. Extensive discussion surrounding sodium-rich foods had with pt.     Weight loss goals/plan: Pt continuing with current exercise regimen     Smoking cessation goals/plan: N/A     Medication changes/plans: Advised to take losartan dose in evening, if able.      Pt education materials provided and discussed regarding above.                  Review of Systems  Constitutional, HEENT, cardiovascular, pulmonary, GI, , musculoskeletal, neuro, skin, endocrine and psych systems are negative, except as otherwise noted.      Objective    /86   Pulse 68   Temp 96.9  F (36.1  C) (Temporal)   Resp 16   Ht 1.707 m (5' 7.21\")   Wt 74.6 kg (164 lb 6.4 oz)   SpO2 99%   BMI 25.59 kg/m    Body mass index is 25.59 kg/m .  Physical Exam   GENERAL: alert and no distress  EYES: Eyes grossly normal to inspection, PERRL and conjunctivae and sclerae normal  NECK: no adenopathy, no asymmetry, masses, or scars  RESP: lungs clear to auscultation - no rales, rhonchi or wheezes  CV: regular rate and rhythm, normal S1 S2, no S3 or S4, no murmur, click or rub, no peripheral edema  MS: no gross musculoskeletal defects noted, no edema  SKIN: no suspicious lesions or rashes  NEURO: Normal strength and tone, mentation intact and speech normal  PSYCH: mentation appears normal, affect normal/bright            Signed Electronically by: Wilber Raymond, DO    "

## 2024-07-16 LAB
ANION GAP SERPL CALCULATED.3IONS-SCNC: 11 MMOL/L (ref 7–15)
BUN SERPL-MCNC: 19 MG/DL (ref 8–23)
CALCIUM SERPL-MCNC: 9.1 MG/DL (ref 8.8–10.2)
CHLORIDE SERPL-SCNC: 103 MMOL/L (ref 98–107)
CREAT SERPL-MCNC: 0.82 MG/DL (ref 0.67–1.17)
EGFRCR SERPLBLD CKD-EPI 2021: >90 ML/MIN/1.73M2
GLUCOSE SERPL-MCNC: 93 MG/DL (ref 70–99)
HCO3 SERPL-SCNC: 27 MMOL/L (ref 22–29)
POTASSIUM SERPL-SCNC: 4.2 MMOL/L (ref 3.4–5.3)
SODIUM SERPL-SCNC: 141 MMOL/L (ref 135–145)

## 2024-07-29 ENCOUNTER — MYC MEDICAL ADVICE (OUTPATIENT)
Dept: FAMILY MEDICINE | Facility: CLINIC | Age: 64
End: 2024-07-29

## 2024-07-31 NOTE — TELEPHONE ENCOUNTER
[facility]    See pt response    Katrina Salgado RN   [facility]

## 2024-07-31 NOTE — TELEPHONE ENCOUNTER
Talked to Patient all is Good Feeling Better    [facility] Unit Coordinator

## 2024-10-03 NOTE — PROGRESS NOTES
DISCHARGE  Reason for Discharge: Patient has failed to schedule further appointments.    Equipment Issued: none    Discharge Plan: Patient to continue home program.    Referring Provider:  Wilber Bennett     07/08/24 0500   Appointment Info   Signing clinician's name / credentials Carmen Conway, PT, DPT, WCS   Total/Authorized Visits 12 per PT   Visits Used 2   Medical Diagnosis Chronic pain of right knee  Chronic pain of left knee   PT Tx Diagnosis Chronic pain of right knee  Chronic pain of left knee   Other pertinent information Insurance wants 6 wk PT prior to MRI   Progress Note/Certification   Therapy Frequency 1x/wk   Predicted Duration 3 mo   Progress Note Due Date 09/23/24   Progress Note Completed Date 06/26/24   GOALS   PT Goals 2   PT Goal 1   Goal Identifier Walking   Goal Description Pt will knee pain <3/10 following a 1 hour walk on any surface w/ elevation changes   Rationale to maximize safety and independence with performance of ADLs and functional tasks;to maximize safety and independence within the home;to maximize safety and independence within the community;to maximize safety and independence with transportation;to maximize safety and independence with self cares   Goal Progress Still painful, will cont to monitor   Target Date 09/23/24   PT Goal 2   Goal Identifier Stairs   Goal Description Pt will report ability to navigate stairs for a shift at work w/ knee pain <3/10   Rationale to maximize safety and independence with performance of ADLs and functional tasks;to maximize safety and independence within the community;to maximize safety and independence within the home;to maximize safety and independence with transportation   Goal Progress Only one shift since last visit, more time required to assess progress towards goal   Target Date 09/23/24   Subjective Report   Subjective Report Edward reports some days feels better, some days has bit of pain. Doing HEP consistently and noting some  "strain. Hiking in [place] in 2 wks, hasn't been working much but will take more shifts in August   Objective Measures   Objective Measures Objective Measure 1   Objective Measure 1   Objective Measure Squat form   Details WNL today and painfree.   Treatment Interventions (PT)   Interventions Therapeutic Procedure/Exercise   Therapeutic Procedure/Exercise   Therapeutic Procedures: strength, endurance, ROM, flexibility minutes (28549) 27   PTRx Ther Proc 1 Lateral Stepdown with Neutral Trunk Position   PTRx Ther Proc 1 - Details from 8\" step 2x10 each leg   PTRx Ther Proc 2 Star Exercise   PTRx Ther Proc 2 - Details x5 each leg   PTRx Ther Proc 3 Single Leg Bridge   PTRx Ther Proc 3 - Details Verbal rev continue   Skilled Intervention Verbal instruction and demo required for proper performance of HEP   Patient Response/Progress no adverse effects   PTRx Ther Proc 4 Side Stepping With Theraband   PTRx Ther Proc 4 - Details Verbal rev continue   PTRx Ther Proc 5 Prone Quad Stretch Assisted   PTRx Ther Proc 5 - Details Verbal rev continue   PTRx Ther Proc 6 Standing Hip Flexor Stretch   PTRx Ther Proc 6 - Details x1 min each side   PTRx Ther Proc 7 Standing Hamstring Stretch   PTRx Ther Proc 7 - Details x1 min each side   Ther Proc 1 Encouraged ice, OTC pain relief, and knee sleeve as needed on upcoming hiking trip. Encouraged stretching post   Therapeutic Activity   PTRx Ther Act 1 Dumbbell Squats   PTRx Ther Act 1 - Details x15 holding 20# DB   Education   Learner/Method Patient;Listening;Demonstration;No Barriers to Learning;Pictures/Video   Plan   Home program printed and online access   Updates to plan of care progressed strength/stretching   Plan for next session Progress strenght as alley- step ups, lunges, RDL, etc   Total Session Time   Timed Code Treatment Minutes 27   Total Treatment Time (sum of timed and untimed services) 27       "

## 2025-04-03 ENCOUNTER — THERAPY VISIT (OUTPATIENT)
Age: 65
End: 2025-04-03
Payer: COMMERCIAL

## 2025-04-03 DIAGNOSIS — G89.29 CHRONIC PAIN OF RIGHT KNEE: Primary | ICD-10-CM

## 2025-04-03 DIAGNOSIS — M25.561 CHRONIC PAIN OF RIGHT KNEE: Primary | ICD-10-CM

## 2025-04-03 ASSESSMENT — ACTIVITIES OF DAILY LIVING (ADL)
WEAKNESS: THE SYMPTOM AFFECTS MY ACTIVITY MODERATELY
WALK: ACTIVITY IS FAIRLY DIFFICULT
STAND: ACTIVITY IS MINIMALLY DIFFICULT
LIMPING: THE SYMPTOM AFFECTS MY ACTIVITY SEVERELY
SWELLING: I DO NOT HAVE THE SYMPTOM
GIVING WAY, BUCKLING OR SHIFTING OF KNEE: THE SYMPTOM AFFECTS MY ACTIVITY SLIGHTLY
GIVING WAY, BUCKLING OR SHIFTING OF KNEE: THE SYMPTOM AFFECTS MY ACTIVITY SLIGHTLY
RAW_SCORE: 44
PLEASE_INDICATE_YOR_PRIMARY_REASON_FOR_REFERRAL_TO_THERAPY:: KNEE
KNEEL ON THE FRONT OF YOUR KNEE: ACTIVITY IS NOT DIFFICULT
HOW_WOULD_YOU_RATE_THE_OVERALL_FUNCTION_OF_YOUR_KNEE_DURING_YOUR_USUAL_DAILY_ACTIVITIES?: ABNORMAL
GO UP STAIRS: ACTIVITY IS MINIMALLY DIFFICULT
HOW_WOULD_YOU_RATE_THE_CURRENT_FUNCTION_OF_YOUR_KNEE_DURING_YOUR_USUAL_DAILY_ACTIVITIES_ON_A_SCALE_FROM_0_TO_100_WITH_100_BEING_YOUR_LEVEL_OF_KNEE_FUNCTION_PRIOR_TO_YOUR_INJURY_AND_0_BEING_THE_INABILITY_TO_PERFORM_ANY_OF_YOUR_USUAL_DAILY_ACTIVITIES?: 50
WEAKNESS: THE SYMPTOM AFFECTS MY ACTIVITY MODERATELY
AS_A_RESULT_OF_YOUR_KNEE_INJURY,_HOW_WOULD_YOU_RATE_YOUR_CURRENT_LEVEL_OF_DAILY_ACTIVITY?: NEARLY NORMAL
SIT WITH YOUR KNEE BENT: ACTIVITY IS SOMEWHAT DIFFICULT
KNEEL ON THE FRONT OF YOUR KNEE: ACTIVITY IS NOT DIFFICULT
SIT WITH YOUR KNEE BENT: ACTIVITY IS SOMEWHAT DIFFICULT
PAIN: THE SYMPTOM AFFECTS MY ACTIVITY MODERATELY
PAIN: THE SYMPTOM AFFECTS MY ACTIVITY MODERATELY
STIFFNESS: I HAVE THE SYMPTOM BUT IT DOES NOT AFFECT MY ACTIVITY
SQUAT: ACTIVITY IS SOMEWHAT DIFFICULT
HOW_WOULD_YOU_RATE_THE_CURRENT_FUNCTION_OF_YOUR_KNEE_DURING_YOUR_USUAL_DAILY_ACTIVITIES_ON_A_SCALE_FROM_0_TO_100_WITH_100_BEING_YOUR_LEVEL_OF_KNEE_FUNCTION_PRIOR_TO_YOUR_INJURY_AND_0_BEING_THE_INABILITY_TO_PERFORM_ANY_OF_YOUR_USUAL_DAILY_ACTIVITIES?: 50
HOW_WOULD_YOU_RATE_THE_OVERALL_FUNCTION_OF_YOUR_KNEE_DURING_YOUR_USUAL_DAILY_ACTIVITIES?: ABNORMAL
STAND: ACTIVITY IS MINIMALLY DIFFICULT
LIMPING: THE SYMPTOM AFFECTS MY ACTIVITY SEVERELY
WALK: ACTIVITY IS FAIRLY DIFFICULT
KNEE_ACTIVITY_OF_DAILY_LIVING_SCORE: 62.86
RISE FROM A CHAIR: ACTIVITY IS MINIMALLY DIFFICULT
STIFFNESS: I HAVE THE SYMPTOM BUT IT DOES NOT AFFECT MY ACTIVITY
GO UP STAIRS: ACTIVITY IS MINIMALLY DIFFICULT
RISE FROM A CHAIR: ACTIVITY IS MINIMALLY DIFFICULT
AS_A_RESULT_OF_YOUR_KNEE_INJURY,_HOW_WOULD_YOU_RATE_YOUR_CURRENT_LEVEL_OF_DAILY_ACTIVITY?: NEARLY NORMAL
KNEE_ACTIVITY_OF_DAILY_LIVING_SUM: 44
SQUAT: ACTIVITY IS SOMEWHAT DIFFICULT
GO DOWN STAIRS: ACTIVITY IS FAIRLY DIFFICULT
GO DOWN STAIRS: ACTIVITY IS FAIRLY DIFFICULT
SWELLING: I DO NOT HAVE THE SYMPTOM

## 2025-04-03 NOTE — PROGRESS NOTES
PHYSICAL THERAPY EVALUATION  Type of Visit: Evaluation              Subjective     Edward reports was sick w/ Covid and out of town a lot last fall which interrupted his ability to come to PT. He was working on HEP w/ some relief initially. The past 3 weeks pain increased w/ no known cause/MARICEL. Is able to stationary bike w/out issue but walking, standing, and sitting all painful. Pain currently 100% in the R knee, just at medial joint line. No pain in L knee currently. 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         Presenting condition or subjective complaint: The inside of my right knee hurts most of the time including when I'm walking, sitting, sleeping.  Date of onset: 04/03/25    Relevant medical history: High blood pressure   Dates & types of surgery: April 2022.   Removed an enlarged nerve on the bottom of my right foot ([place]ons Neuroma)    Prior diagnostic imaging/testing results: X-ray     Prior therapy history for the same diagnosis, illness or injury: Yes July 2025.  Physical Therapy.    Exercises to help alleviate the pain.    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    Pain assessment: See objective evaluation for additional pain details     Objective   KNEE EVALUATION  PAIN: Pain Location: Medial R knee  Pain is Exacerbated By: descending stairs/hills, walking, prolonged sitting  Pain is Relieved By: cold, NSAIDs, and rest  INTEGUMENTARY (edema, incisions): WNL  POSTURE: WNL  GAIT:  Weightbearing  Status: WBAT  Assistive Device(s): None  Gait Deviations: Antalgic  BALANCE/PROPRIOCEPTION: Single Leg Stance Eyes Open (seconds): 30 seconds bilaterally, no pain  WEIGHTBEARING ALIGNMENT:   NON-WEIGHTBEARING ALIGNMENT:   ROM:   (Degrees) Left AROM Left PROM  Right AROM Right PROM   Knee Flexion 140  135, pain    Knee Extension 5  0    Pain:   End feel:     STRENGTH:  5/5 hip, glute, quad, and HS  Pain: - none + mild ++ moderate +++ severe  Strength Scale: 0-5/5 Left Right   Knee Flexion 5 5   Iliopsoas 5 5   Glute med 5 5   Glute max 5 5   Hamstring 5 5   Hip ER 5 5   Hip IR 5 4     FLEXIBILITY: WNL  SPECIAL TESTS:    Left Right   Apley's (Meniscus)     Yenni's (Meniscus)     Chico's (ITB/TFL)     Patellar Apprehension Test Negative  Negative    Patella Tracking     Ligamentous Stability     Anterior Drawer (ACL)     Posterior Drawer (PCL)     Prone Dial Test at 30 Deg and 90 Deg (PCL/PLC)     Valgus Stress Testing at 0 Deg and 30 Deg Negative,   Negative,     Varus Stress Testing at 0 Deg and 30 Deg  Negative,   Negative,       FUNCTIONAL TESTS: Double Leg Squat: Begins w/ excessive hip abduction and painful. Continued pain w/ cues to correct form  Single Leg Squat: Knee valgus, Hip internal rotation, and Improper use of glutes/hips  PALPATION:  Only spot TTP is medial R knee joint line  JOINT MOBILITY:  R patellar mobility WNL painfree    Assessment & Plan   CLINICAL IMPRESSIONS  Medical Diagnosis: Chronic pain of right knee  Chronic pain of left knee    Treatment Diagnosis: Chronic R knee pain   Impression/Assessment: Patient is a 64 year old male with R knee complaints.  The following significant findings have been identified: Pain, Decreased ROM/flexibility, Decreased strength, Impaired gait, Impaired muscle performance, and Decreased activity tolerance. These impairments interfere with their ability to perform self care tasks, work tasks, recreational activities, household chores, driving , household  mobility, and community mobility as compared to previous level of function.     Clinical Decision Making (Complexity):  Clinical Presentation: Stable/Uncomplicated  Clinical Presentation Rationale: based on medical and personal factors listed in PT evaluation  Clinical Decision Making (Complexity): Low complexity    PLAN OF CARE  Treatment Interventions:  Interventions: Gait Training, Manual Therapy, Neuromuscular Re-education, Therapeutic Activity, Therapeutic Exercise, Self-Care/Home Management    Long Term Goals     PT Goal 1  Goal Identifier: Stairs  Goal Description: Pt will ascend/descend 2 flights of stairs w/ reciprocal gait and no knee pain  Rationale: to maximize safety and independence with performance of ADLs and functional tasks;to maximize safety and independence within the home;to maximize safety and independence within the community;to maximize safety and independence with transportation;to maximize safety and independence with self cares  Target Date: 07/01/25  PT Goal 2  Goal Identifier: Ambulation  Goal Description: Pt will report no knee pain w/ walking 1 hour on level and unlevel surfaces  Rationale: to maximize safety and independence within the community;to maximize safety and independence with performance of ADLs and functional tasks;to maximize safety and independence with self cares;to maximize safety and independence with transportation (for work duties as usher)  Target Date: 07/01/25      Frequency of Treatment: 1x/wk for 6wk mo weaning to every-other wk  Duration of Treatment: 3 mo    Recommended Referrals to Other Professionals:   Education Assessment:   Learner/Method: Patient;Listening;Demonstration;Pictures/Video;No Barriers to Learning    Risks and benefits of evaluation/treatment have been explained.   Patient/Family/caregiver agrees with Plan of Care.     Evaluation Time:     PT Eval, Low Complexity Minutes (47592): 16       Signing Clinician: MARISOL RUBIO PT

## 2025-04-07 ENCOUNTER — THERAPY VISIT (OUTPATIENT)
Age: 65
End: 2025-04-07
Payer: COMMERCIAL

## 2025-04-07 DIAGNOSIS — G89.29 CHRONIC PAIN OF RIGHT KNEE: Primary | ICD-10-CM

## 2025-04-07 DIAGNOSIS — M25.561 CHRONIC PAIN OF RIGHT KNEE: Primary | ICD-10-CM

## 2025-04-07 PROCEDURE — 97110 THERAPEUTIC EXERCISES: CPT | Mod: GP

## 2025-04-07 PROCEDURE — 97530 THERAPEUTIC ACTIVITIES: CPT | Mod: GP

## 2025-04-08 ENCOUNTER — PATIENT OUTREACH (OUTPATIENT)
Dept: CARE COORDINATION | Facility: CLINIC | Age: 65
End: 2025-04-08
Payer: COMMERCIAL

## 2025-04-08 ENCOUNTER — MYC MEDICAL ADVICE (OUTPATIENT)
Dept: FAMILY MEDICINE | Facility: CLINIC | Age: 65
End: 2025-04-08
Payer: COMMERCIAL

## 2025-04-08 DIAGNOSIS — M25.561 CHRONIC PAIN OF RIGHT KNEE: Primary | ICD-10-CM

## 2025-04-08 DIAGNOSIS — G89.29 CHRONIC PAIN OF RIGHT KNEE: Primary | ICD-10-CM

## 2025-04-08 NOTE — TELEPHONE ENCOUNTER
Dr. Bledsoe,    Please see below MyChart message and advise.   Ortho Referral pended, if approved.      Thanks,   Bharati PURDY RN

## 2025-04-09 ENCOUNTER — PATIENT OUTREACH (OUTPATIENT)
Dept: CARE COORDINATION | Facility: CLINIC | Age: 65
End: 2025-04-09
Payer: COMMERCIAL

## 2025-04-22 ENCOUNTER — PATIENT OUTREACH (OUTPATIENT)
Dept: CARE COORDINATION | Facility: CLINIC | Age: 65
End: 2025-04-22
Payer: COMMERCIAL

## 2025-04-23 ENCOUNTER — TELEPHONE (OUTPATIENT)
Dept: ORTHOPEDICS | Facility: CLINIC | Age: 65
End: 2025-04-23
Payer: COMMERCIAL

## 2025-04-23 NOTE — TELEPHONE ENCOUNTER
Left a voicemail for patient discussing a follow up appointment with Dr. Bennett regarding MRI on 4/28/2025. Patient will call patient to utilize a AMY slot or schedule with #: [phone]. He will reach out with any other questions!    Herber Ortega ATC

## 2025-05-12 ENCOUNTER — ANCILLARY PROCEDURE (OUTPATIENT)
Dept: MRI IMAGING | Facility: CLINIC | Age: 65
End: 2025-05-12
Attending: FAMILY MEDICINE
Payer: COMMERCIAL

## 2025-05-12 ENCOUNTER — RESULTS FOLLOW-UP (OUTPATIENT)
Dept: FAMILY MEDICINE | Facility: CLINIC | Age: 65
End: 2025-05-12

## 2025-05-12 DIAGNOSIS — G89.29 CHRONIC PAIN OF RIGHT KNEE: ICD-10-CM

## 2025-05-12 DIAGNOSIS — M25.561 CHRONIC PAIN OF RIGHT KNEE: ICD-10-CM

## 2025-05-12 PROCEDURE — 73721 MRI JNT OF LWR EXTRE W/O DYE: CPT | Mod: RT | Performed by: RADIOLOGY

## 2025-05-14 ENCOUNTER — OFFICE VISIT (OUTPATIENT)
Dept: ORTHOPEDICS | Facility: CLINIC | Age: 65
End: 2025-05-14
Attending: FAMILY MEDICINE
Payer: COMMERCIAL

## 2025-05-14 DIAGNOSIS — S83.231D COMPLEX TEAR OF MEDIAL MENISCUS OF RIGHT KNEE AS CURRENT INJURY, SUBSEQUENT ENCOUNTER: Primary | ICD-10-CM

## 2025-05-14 DIAGNOSIS — M25.561 CHRONIC PAIN OF RIGHT KNEE: ICD-10-CM

## 2025-05-14 DIAGNOSIS — G89.29 CHRONIC PAIN OF RIGHT KNEE: ICD-10-CM

## 2025-05-14 PROCEDURE — 99213 OFFICE O/P EST LOW 20 MIN: CPT | Performed by: FAMILY MEDICINE

## 2025-05-14 NOTE — LETTER
5/14/2025      RE: Isacc Louis  [address] [id number]     Dear Colleague,    Thank you for referring your patient, Isacc Louis, to the [facility] [place]. Please see a copy of my visit note below.    Sports Medicine Clinic Visit    PCP: Wilber Bennett    Isacc Louis is a 64 year old male who is seen in consultation at the request of Dr. Ladi Raymond presenting with right knee pain, history of mild right knee osteoarthritis.    Patient indicates that despite physical therapy, in the last year he has had daily discomfort involving the right knee.  Will bother him in the posterior medial aspect of the knee with weightbearing.  Makes it difficult to bend and straighten the knee completely.  He cannot tolerate exercise.  It aches at night.  Physical therapy did not seem to improve the problem.      Standing x-rays of the right knee 6/13/2024 consistent with mild medial joint space narrowing.    Patient saw physical therapy 1 year ago for right knee discomfort.  Patient saw physical therapy again in the month of April 2025 for right-sided knee discomfort    MRI of the right knee 5/12/2025 showed intact cruciate ligaments, complex medial meniscus tear involving the body and posterior horn of the medial meniscus with extruded medial meniscus.  Lateral meniscus intact.  Degenerative chondromalacia involving the lateral tibial plateau.          MR right knee without contrast 5/12/2025 6:42 AM     Techniques: Multiplanar multisequence imaging of the right knee was  obtained without administration of intra-articular or intravenous  contrast using routine protocol.     History: Ongoing right knee pain despite several weeks of physical  therapy.; Chronic pain of right knee; Chronic pain of right knee     Additional History from EMR: Medial right knee pain with activity.     Comparison: Knee x-ray 5/8/2024     Findings:     MENISCI:  Medial  meniscus: Marked signal in the body and posterior horn of the  medial meniscus with extrusion and multidirectional discontinuities  consistent with a complex medial meniscus tear.  Lateral meniscus: Intact.     LIGAMENTS  Cruciate ligaments: Intact.  Medial supporting structures: Intact.  Lateral supporting structures: Intact.     EXTENSOR MECHANISM  Intact.     FLUID  No joint effusion. Small Baker's cyst.     OSSEOUS and ARTICULAR STRUCTURES  Bones: No fracture. There is bone edema in the anterior medial tibial  plateau likely sequelae of adjacent medial meniscal injury as  described above.     Patellofemoral compartment: No significant hyaline cartilage disease.     Medial compartment: Generalized thinning and mild chondromalacia of  the medial compartment of the knee.     Lateral compartment: There is focal full-thickness chondromalacia of  the medial aspect of the lateral tibial plateau with bony edema of the  lateral intercondylar tubercle.     ANCILLARY FINDINGS  None.                                                                      Impression:  1. Complex tearing of the right knee medial meniscus involving the  body and posterior horn with resultant peripheral meniscal extrusion.  Underlying bony edema at the anteromedial tibial plateau likely  reactive to the above meniscal tear.     2. High-grade full-thickness chondromalacia in the far medial aspect  of the lateral tibial plateau with resultant bony edema at the lateral  intercondylar tubercle. Additional lower grade mild generalized  chondromalacia of the medial compartment of the knee.     3. The cruciate ligaments, lateral meniscus and medial/lateral  supporting structures are intact.     I have personally reviewed the examination and initial interpretation  and I agree with the findings.     NASRIN DAN MD       [facility]:  Past Medical History:   Diagnosis Date     CARDIOVASCULAR SCREENING; LDL GOAL LESS THAN 160 6/18/2013     NO ACTIVE PROBLEMS  (aka NONE)        Active problem list:  Patient Active Problem List   Diagnosis     CARDIOVASCULAR SCREENING; LDL GOAL LESS THAN 160     NO ACTIVE PROBLEMS (aka NONE)     Onychomycosis     Sleep disturbances     Neuroma     Chronic pain of left knee     Chronic pain of right knee       FH:  Family History   Problem Relation Age of Onset     C.A.D. Father          from an MI at 56     Myocardial Infarction Brother        SH:  Social History     Socioeconomic History     Marital status:      Spouse name: Mel     Number of children: 2     Years of education: Not on file     Highest education level: Not on file   Occupational History     Occupation: [employer]     Employer: [employer]     Comment: [employer]   Tobacco Use     Smoking status: Never     Smokeless tobacco: Never   Substance and Sexual Activity     Alcohol use: Yes     Comment: 4 beers over the weekend     Drug use: No     Sexual activity: Yes     Partners: Female   Other Topics Concern     Parent/sibling w/ CABG, MI or angioplasty before 65F 55M? Not Asked      Service Not Asked     Blood Transfusions Not Asked     Caffeine Concern Not Asked     Comment: Coffee - 2 cups of coffee a day     Occupational Exposure Not Asked     Hobby Hazards Not Asked     Sleep Concern Yes     Comment: Having trouble with insomnia     Stress Concern Not Asked     Weight Concern Not Asked     Special Diet Not Asked     Back Care Not Asked     Exercise Not Asked     Comment: Walks - 3 times a week     Bike Helmet Not Asked     Seat Belt Not Asked     Self-Exams Not Asked   Social History Narrative     Not on file     Social Drivers of Health     Financial Resource Strain: Low Risk  (2024)    Financial Resource Strain      Within the past 12 months, have you or your family members you live with been unable to get utilities (heat, electricity) when it was really needed?: No   Food Insecurity: Low Risk  (2024)    Food Insecurity      Within the past  12 months, did you worry that your food would run out before you got money to buy more?: No      Within the past 12 months, did the food you bought just not last and you didn t have money to get more?: No   Transportation Needs: Low Risk  (5/8/2024)    Transportation Needs      Within the past 12 months, has lack of transportation kept you from medical appointments, getting your medicines, non-medical meetings or appointments, work, or from getting things that you need?: No   Physical Activity: Sufficiently Active (5/8/2024)    Exercise Vital Sign      Days of Exercise per Week: 6 days      Minutes of Exercise per Session: 30 min   Stress: No Stress Concern Present (5/8/2024)    [place] of Occupational [employer] - Occupational Stress Questionnaire      Feeling of Stress : Only a little   Social Connections: Unknown (5/8/2024)    Social Connection and Isolation Panel [NHANES]      Frequency of Communication with Friends and Family: Not on file      Frequency of Social Gatherings with Friends and Family: Once a week      Attends Cheondoism Services: Not on file      Active Member of Clubs or Organizations: Not on file      Attends Club or Organization Meetings: Not on file      Marital Status: Not on file   Interpersonal Safety: Low Risk  (5/8/2024)    Interpersonal Safety      Do you feel physically and emotionally safe where you currently live?: Yes      Within the past 12 months, have you been hit, slapped, kicked or otherwise physically hurt by someone?: No      Within the past 12 months, have you been humiliated or emotionally abused in other ways by your partner or ex-partner?: No   Housing Stability: Low Risk  (5/8/2024)    Housing Stability      Do you have housing? : Yes      Are you worried about losing your housing?: No       MEDS:  See EMR, reviewed  ALL:  See EMR, reviewed    REVIEW OF SYSTEMS:  CONSTITUTIONAL:NEGATIVE for fever, chills, change in weight  INTEGUMENTARY/SKIN: NEGATIVE for worrisome  rashes, moles or lesions  EYES: NEGATIVE for vision changes or irritation  ENT/MOUTH: NEGATIVE for ear, mouth and throat problems  RESP:NEGATIVE for significant cough or SOB  BREAST: NEGATIVE for masses, tenderness or discharge  CV: NEGATIVE for chest pain, palpitations or peripheral edema  GI: NEGATIVE for nausea, abdominal pain, heartburn, or change in bowel habits  :NEGATIVE for frequency, dysuria, or hematuria  :NEGATIVE for frequency, dysuria, or hematuria  NEURO: NEGATIVE for weakness, dizziness or paresthesias  ENDOCRINE: NEGATIVE for temperature intolerance, skin/hair changes  HEME/ALLERGY/IMMUNE: NEGATIVE for bleeding problems  PSYCHIATRIC: NEGATIVE for changes in mood or affect          Objective: Right knee reveals no effusion.  He can flex and extend the knee fully.  Tender over the posterior medial joint line, nontender over the pes anserine bursa or patellar tendon.  Overlying skin is normal.  Appropriate in conversation and affect.      I personally reviewed the patient MRI results of the knee indicating the complex posterior medial meniscal tear    Assessment: Right-sided knee complex posterior medial meniscus tear with persistent mechanical knee discomfort x 1 year despite physical therapy    Plan: We discussed options including cortisone shot, declined by patient.  He would like to discuss surgical options with orthopedic surgery.  Referral to Dr. Amber blackburn.                        Again, thank you for allowing me to participate in the care of your patient.      Sincerely,    Dereck Auguste MD

## 2025-05-14 NOTE — PROGRESS NOTES
Sports Medicine Clinic Visit    PCP: Wilber Bennett    Isacc Louis is a 64 year old male who is seen in consultation at the request of Dr. Ladi Raymond presenting with right knee pain, history of mild right knee osteoarthritis.    Patient indicates that despite physical therapy, in the last year he has had daily discomfort involving the right knee.  Will bother him in the posterior medial aspect of the knee with weightbearing.  Makes it difficult to bend and straighten the knee completely.  He cannot tolerate exercise.  It aches at night.  Physical therapy did not seem to improve the problem.      Standing x-rays of the right knee 6/13/2024 consistent with mild medial joint space narrowing.    Patient saw physical therapy 1 year ago for right knee discomfort.  Patient saw physical therapy again in the month of April 2025 for right-sided knee discomfort    MRI of the right knee 5/12/2025 showed intact cruciate ligaments, complex medial meniscus tear involving the body and posterior horn of the medial meniscus with extruded medial meniscus.  Lateral meniscus intact.  Degenerative chondromalacia involving the lateral tibial plateau.          MR right knee without contrast 5/12/2025 6:42 AM     Techniques: Multiplanar multisequence imaging of the right knee was  obtained without administration of intra-articular or intravenous  contrast using routine protocol.     History: Ongoing right knee pain despite several weeks of physical  therapy.; Chronic pain of right knee; Chronic pain of right knee     Additional History from EMR: Medial right knee pain with activity.     Comparison: Knee x-ray 5/8/2024     Findings:     MENISCI:  Medial meniscus: Marked signal in the body and posterior horn of the  medial meniscus with extrusion and multidirectional discontinuities  consistent with a complex medial meniscus tear.  Lateral meniscus: Intact.     LIGAMENTS  Cruciate ligaments: Intact.  Medial supporting  structures: Intact.  Lateral supporting structures: Intact.     EXTENSOR MECHANISM  Intact.     FLUID  No joint effusion. Small Baker's cyst.     OSSEOUS and ARTICULAR STRUCTURES  Bones: No fracture. There is bone edema in the anterior medial tibial  plateau likely sequelae of adjacent medial meniscal injury as  described above.     Patellofemoral compartment: No significant hyaline cartilage disease.     Medial compartment: Generalized thinning and mild chondromalacia of  the medial compartment of the knee.     Lateral compartment: There is focal full-thickness chondromalacia of  the medial aspect of the lateral tibial plateau with bony edema of the  lateral intercondylar tubercle.     ANCILLARY FINDINGS  None.                                                                      Impression:  1. Complex tearing of the right knee medial meniscus involving the  body and posterior horn with resultant peripheral meniscal extrusion.  Underlying bony edema at the anteromedial tibial plateau likely  reactive to the above meniscal tear.     2. High-grade full-thickness chondromalacia in the far medial aspect  of the lateral tibial plateau with resultant bony edema at the lateral  intercondylar tubercle. Additional lower grade mild generalized  chondromalacia of the medial compartment of the knee.     3. The cruciate ligaments, lateral meniscus and medial/lateral  supporting structures are intact.     I have personally reviewed the examination and initial interpretation  and I agree with the findings.     NASRIN DAN MD       PMH:  Past Medical History:   Diagnosis Date    CARDIOVASCULAR SCREENING; LDL GOAL LESS THAN 160 6/18/2013    NO ACTIVE PROBLEMS (aka NONE)        Active problem list:  Patient Active Problem List   Diagnosis    CARDIOVASCULAR SCREENING; LDL GOAL LESS THAN 160    NO ACTIVE PROBLEMS (aka NONE)    Onychomycosis    Sleep disturbances    Neuroma    Chronic pain of left knee    Chronic pain of right knee        FH:  Family History   Problem Relation Age of Onset    C.A.D. Father          from an MI at 56    Myocardial Infarction Brother        SH:  Social History     Socioeconomic History    Marital status:      Spouse name: Mel    Number of children: 2    Years of education: Not on file    Highest education level: Not on file   Occupational History    Occupation: Sales Operation     Employer: [employer]     Comment: [employer]   Tobacco Use    Smoking status: Never    Smokeless tobacco: Never   Substance and Sexual Activity    Alcohol use: Yes     Comment: 4 beers over the weekend    Drug use: No    Sexual activity: Yes     Partners: Female   Other Topics Concern    Parent/sibling w/ CABG, MI or angioplasty before 65F 55M? Not Asked     Service Not Asked    Blood Transfusions Not Asked    Caffeine Concern Not Asked     Comment: Coffee - 2 cups of coffee a day    Occupational Exposure Not Asked    Hobby Hazards Not Asked    Sleep Concern Yes     Comment: Having trouble with insomnia    Stress Concern Not Asked    Weight Concern Not Asked    Special Diet Not Asked    Back Care Not Asked    Exercise Not Asked     Comment: Walks - 3 times a week    Bike Helmet Not Asked    Seat Belt Not Asked    Self-Exams Not Asked   Social History Narrative    Not on file     Social Drivers of Health     Financial Resource Strain: Low Risk  (2024)    Financial Resource Strain     Within the past 12 months, have you or your family members you live with been unable to get utilities (heat, electricity) when it was really needed?: No   Food Insecurity: Low Risk  (2024)    Food Insecurity     Within the past 12 months, did you worry that your food would run out before you got money to buy more?: No     Within the past 12 months, did the food you bought just not last and you didn t have money to get more?: No   Transportation Needs: Low Risk  (2024)    Transportation Needs     Within the past 12 months, has lack  of transportation kept you from medical appointments, getting your medicines, non-medical meetings or appointments, work, or from getting things that you need?: No   Physical Activity: Sufficiently Active (5/8/2024)    Exercise Vital Sign     Days of Exercise per Week: 6 days     Minutes of Exercise per Session: 30 min   Stress: No Stress Concern Present (5/8/2024)    [place] [employer]ational [employer] - Occupational Stress Questionnaire     Feeling of Stress : Only a little   Social Connections: Unknown (5/8/2024)    Social Connection and Isolation Panel [NHANES]     Frequency of Communication with Friends and Family: Not on file     Frequency of Social Gatherings with Friends and Family: Once a week     Attends Yarsani Services: Not on file     Active Member of Clubs or Organizations: Not on file     Attends Club or Organization Meetings: Not on file     Marital Status: Not on file   Interpersonal Safety: Low Risk  (5/8/2024)    Interpersonal Safety     Do you feel physically and emotionally safe where you currently live?: Yes     Within the past 12 months, have you been hit, slapped, kicked or otherwise physically hurt by someone?: No     Within the past 12 months, have you been humiliated or emotionally abused in other ways by your partner or ex-partner?: No   Housing Stability: Low Risk  (5/8/2024)    Housing Stability     Do you have housing? : Yes     Are you worried about losing your housing?: No       MEDS:  See EMR, reviewed  ALL:  See EMR, reviewed    REVIEW OF SYSTEMS:  CONSTITUTIONAL:NEGATIVE for fever, chills, change in weight  INTEGUMENTARY/SKIN: NEGATIVE for worrisome rashes, moles or lesions  EYES: NEGATIVE for vision changes or irritation  ENT/MOUTH: NEGATIVE for ear, mouth and throat problems  RESP:NEGATIVE for significant cough or SOB  BREAST: NEGATIVE for masses, tenderness or discharge  CV: NEGATIVE for chest pain, palpitations or peripheral edema  GI: NEGATIVE for nausea, abdominal  pain, heartburn, or change in bowel habits  :NEGATIVE for frequency, dysuria, or hematuria  :NEGATIVE for frequency, dysuria, or hematuria  NEURO: NEGATIVE for weakness, dizziness or paresthesias  ENDOCRINE: NEGATIVE for temperature intolerance, skin/hair changes  HEME/ALLERGY/IMMUNE: NEGATIVE for bleeding problems  PSYCHIATRIC: NEGATIVE for changes in mood or affect          Objective: Right knee reveals no effusion.  He can flex and extend the knee fully.  Tender over the posterior medial joint line, nontender over the pes anserine bursa or patellar tendon.  Overlying skin is normal.  Appropriate in conversation and affect.      I personally reviewed the patient MRI results of the knee indicating the complex posterior medial meniscal tear    Assessment: Right-sided knee complex posterior medial meniscus tear with persistent mechanical knee discomfort x 1 year despite physical therapy    Plan: We discussed options including cortisone shot, declined by patient.  He would like to discuss surgical options with orthopedic surgery.  Referral to Dr. Amber alfonso

## 2025-05-15 ENCOUNTER — PATIENT OUTREACH (OUTPATIENT)
Dept: CARE COORDINATION | Facility: CLINIC | Age: 65
End: 2025-05-15
Payer: COMMERCIAL

## 2025-05-16 ENCOUNTER — TELEPHONE (OUTPATIENT)
Dept: ORTHOPEDICS | Facility: CLINIC | Age: 65
End: 2025-05-16
Payer: COMMERCIAL

## 2025-05-16 NOTE — TELEPHONE ENCOUNTER
Other: Patient called to schedule with the provider from the referral from Dereck Auguste MD. Would not let me schedule due to provider age restriction. Please call patient if he can see the provider.       Could we send this information to you in [place] or would you prefer to receive a phone call?:   Patient would prefer a phone call   Okay to leave a detailed message?: Yes at Cell number on file:    Telephone Information:   Mobile [phone]

## 2025-05-19 ENCOUNTER — OFFICE VISIT (OUTPATIENT)
Dept: ORTHOPEDICS | Facility: CLINIC | Age: 65
End: 2025-05-19
Payer: COMMERCIAL

## 2025-05-19 ENCOUNTER — PRE VISIT (OUTPATIENT)
Dept: ORTHOPEDICS | Facility: CLINIC | Age: 65
End: 2025-05-19

## 2025-05-19 ENCOUNTER — TELEPHONE (OUTPATIENT)
Dept: ORTHOPEDICS | Facility: CLINIC | Age: 65
End: 2025-05-19

## 2025-05-19 ENCOUNTER — PATIENT OUTREACH (OUTPATIENT)
Dept: CARE COORDINATION | Facility: CLINIC | Age: 65
End: 2025-05-19

## 2025-05-19 VITALS — BODY MASS INDEX: 26.37 KG/M2 | HEIGHT: 68 IN | WEIGHT: 174 LBS

## 2025-05-19 DIAGNOSIS — M25.561 CHRONIC PAIN OF RIGHT KNEE: Primary | ICD-10-CM

## 2025-05-19 DIAGNOSIS — G89.29 CHRONIC PAIN OF RIGHT KNEE: Primary | ICD-10-CM

## 2025-05-19 DIAGNOSIS — S83.231D COMPLEX TEAR OF MEDIAL MENISCUS OF RIGHT KNEE AS CURRENT INJURY, SUBSEQUENT ENCOUNTER: ICD-10-CM

## 2025-05-19 NOTE — NURSING NOTE
"Reason For Visit:   Chief Complaint   Patient presents with    Consult     Right knee - Referral from Dr. Auguste          ?  No  Occupation Retired.  Currently working? Yes. -  - [employer]  Work status?  Retired. Part time  Date of injury: N/A  Type of injury: N/A.  Date of surgery: N/A  Type of surgery: N/A.  Smoker: No  Request smoking cessation information: No    SANE Score  Left Knee: 100  Right Knee: 80    Pain Assessment  Patient Currently in Pain: Yes    Ht 1.715 m (5' 7.5\")   Wt 78.9 kg (174 lb)   BMI 26.85 kg/m         No Known Allergies    Current Outpatient Medications   Medication Sig Dispense Refill    ibuprofen (ADVIL/MOTRIN) 200 MG tablet Take 200 mg by mouth every 4 hours as needed for mild pain      losartan (COZAAR) 100 MG tablet Take 1 tablet (100 mg) by mouth daily 90 tablet 3    losartan (COZAAR) 50 MG tablet Take 1 tablet (50 mg) by mouth daily 30 tablet 1     No current facility-administered medications for this visit.         Keira Pizarro CMA    "

## 2025-05-19 NOTE — PROGRESS NOTES
CHIEF CONCERN: Right knee pain    HISTORY:   This is a 64-year-old male who is retired and currently works part-time at the [employer].  He has had over a year of right knee pain.  There is no specific incident that caused this.  He has tried physical therapy without relief.  The pain bothers him during his activities of daily living, and is impacting him from his work.    PAST MEDICAL HISTORY: (Reviewed with the patient and in the EPIC medical record)  None    PAST SURGICAL HISTORY: (Reviewed with the patient and in the EPIC medical record)  Omer's neuroma excision    MEDICATIONS: (Reviewed with the patient and in the EPIC medical record)    Notable medications include: None    ALLERGIES: (Reviewed with the patient and in the EPIC medical record)  None      SOCIAL HISTORY: (Reviewed with the patient and in the medical record)  --Tobacco: None  --Occupation: Part-time at the [employer]  --Avocation/Sport: Walking, hunting    FAMILY HISTORY: (Reviewed with the patient and in the medical record)  -- No family history of bleeding, clotting, or difficulty with anesthesia    REVIEW OF SYSTEMS: (Reviewed with the patient and on the health intake form)  -- A comprehensive 10 point review of systems was conducted and is negative except as noted in the HPI    EXAM:     General: Awake, Alert and Oriented, No acute Distress. Articulate and Interactive    Body mass index is 26.85 kg/m .    Right lower extremity :  Skin is Warm and Well perfused, no suggestion of infection  Posterior medial tenderness to palpation over the medial joint line.  No lateral joint line tenderness.  Knee is stable to anterior posterior drawer  Stable to varus and valgus stress  No effusion    IMAGING:    Radiographs of the right knee from 5/8/2024 were independently reviewed by me and findings were discussed with the patient today. The imaging demonstrates no evidence of osteoarthritis or acute trauma.    MRI of the right knee  from 5/12/2025 were independently reviewed by me and findings were discussed with the patient today. The imaging demonstrates complex posterior horn medial meniscus tear.    ASSESSMENT:  64-year-old male with a right knee medial meniscus tear which has been painful for over a year despite physical therapy    PLAN:  Discussion with the patient about potential treatment strategies.  After reviewing his imaging and discussing surgical versus nonsurgical treatment the patient elected to proceed with arthroscopic partial medial meniscectomy.  He would like to schedule this soon as possible.  All questions were answered.  Preoperative teaching completed today      Patient seen with Dr. Amber Krueger MD  Orthopaedic Surgery PGY-5

## 2025-05-19 NOTE — LETTER
5/19/2025      Isacc Louis  [address] [facility] [address]      Dear Colleague,    Thank you for referring your patient, Isacc Louis, to the [facility] [place]. Please see a copy of my visit note below.    Patient seen and examined with the resident. I also personally reviewed the images and interpreted the imaging myself.     Assesment: Symptomatic medial meniscus tear right knee    Plan: I discussed with the patient the risks, benefits, complications and techniques of surgery as well as the natural history of medial meniscus tears and the alternative treatment options.    The risks include, but are not limited to the risk of death and risk of a myocardial infarction, risk of bleeding and a risk of infection, risk of nerve damage and a risk of muscle damage, stiffness, instability, continued pain or worsening pain and retear of the meniscus, subsequent arthritis.    The surgical plan will be examination under anesthesia, knee arthroscopy, partial meniscectomy    The patient was provided an opportunity to ask questions and these were answered.      I agree with history, physical and imaging as well as the assessment and plan as detailed by Dr. Krueger.     CHIEF CONCERN: Right knee pain    HISTORY:   This is a 64-year-old male who is retired and currently works part-time at the [employer].  He has had over a year of right knee pain.  There is no specific incident that caused this.  He has tried physical therapy without relief.  The pain bothers him during his activities of daily living, and is impacting him from his work.    PAST MEDICAL HISTORY: (Reviewed with the patient and in the [facility] medical record)  None    PAST SURGICAL HISTORY: (Reviewed with the patient and in the [facility] medical record)  Kan's neuroma excision    MEDICATIONS: (Reviewed with the patient and in the [facility] medical record)    Notable medications include:  None    ALLERGIES: (Reviewed with the patient and in the [facility] medical record)  None      SOCIAL HISTORY: (Reviewed with the patient and in the medical record)  --Tobacco: None  --Occupation: Part-time at the [employer]  --Avocation/Sport: Walking, hunting    FAMILY HISTORY: (Reviewed with the patient and in the medical record)  -- No family history of bleeding, clotting, or difficulty with anesthesia    REVIEW OF SYSTEMS: (Reviewed with the patient and on the health intake form)  -- A comprehensive 10 point review of systems was conducted and is negative except as noted in the HPI    EXAM:     General: Awake, Alert and Oriented, No acute Distress. Articulate and Interactive    Body mass index is 26.85 kg/m .    Right lower extremity :  Skin is Warm and Well perfused, no suggestion of infection  Posterior medial tenderness to palpation over the medial joint line.  No lateral joint line tenderness.  Knee is stable to anterior posterior drawer  Stable to varus and valgus stress  No effusion    IMAGING:    Radiographs of the right knee from 5/8/2024 were independently reviewed by me and findings were discussed with the patient today. The imaging demonstrates no evidence of osteoarthritis or acute trauma.    MRI of the right knee from 5/12/2025 were independently reviewed by me and findings were discussed with the patient today. The imaging demonstrates complex posterior horn medial meniscus tear.    ASSESSMENT:  64-year-old male with a right knee medial meniscus tear which has been painful for over a year despite physical therapy    PLAN:  Discussion with the patient about potential treatment strategies.  After reviewing his imaging and discussing surgical versus nonsurgical treatment the patient elected to proceed with arthroscopic partial medial meniscectomy.  He would like to schedule this soon as possible.  All questions were answered.  Preoperative teaching completed today      Patient seen with   Amber Krueger MD  Orthopaedic Surgery PGY-5        Again, thank you for allowing me to participate in the care of your patient.        Sincerely,        Sin Clark MD    Electronically signed

## 2025-05-19 NOTE — TELEPHONE ENCOUNTER
Patient confirmed scheduled appointment:  Date: 5/19/2025  Time: 11:40am  Visit type: NEW KNEE  Provider:   Location: [facility]

## 2025-05-19 NOTE — PROGRESS NOTES
Patient seen and examined with the resident. I also personally reviewed the images and interpreted the imaging myself.     Assesment: Symptomatic medial meniscus tear right knee    Plan: I discussed with the patient the risks, benefits, complications and techniques of surgery as well as the natural history of medial meniscus tears and the alternative treatment options.    The risks include, but are not limited to the risk of death and risk of a myocardial infarction, risk of bleeding and a risk of infection, risk of nerve damage and a risk of muscle damage, stiffness, instability, continued pain or worsening pain and retear of the meniscus, subsequent arthritis.    The surgical plan will be examination under anesthesia, knee arthroscopy, partial meniscectomy    The patient was provided an opportunity to ask questions and these were answered.      I agree with history, physical and imaging as well as the assessment and plan as detailed by Dr. Krueger.

## 2025-05-19 NOTE — TELEPHONE ENCOUNTER
DIAGNOSIS:   Complex tear of medial meniscus of right knee as current injury, subsequent encounter [G82.837M]  - Primary  Chronic pain of right knee [M25.561, G89.29]   APPOINTMENT DATE: 05/19/2025   NOTES STATUS DETAILS   OFFICE NOTE from referring provider Internal Dereck Auguste MD  Sports Medicine   OFFICE NOTE from other specialist Internal [facility]   (IMAGES & REPORTS) Internal

## 2025-05-19 NOTE — TELEPHONE ENCOUNTER
Procedure: Meniscectomy  Facility: [facility]  Length: 60 minutes  Anesthesia: Choice  Post-op appointments needed: 2 weeks Provider/PA only, 6 weeks with provider only.  Surgery packet/instructions given to patient?  Yes   PT: DARLINE  PCP: DARLINE      Teaching Flowsheet     Visit Type: In Clinic      Total Time Spent: 15 min.    Teaching Topic: Surgery teaching    Surgeon: Dr. Clark  Type of Anesthesia: Choice    Pertinent Medical History: No Pertinent Medical History  Were medical conditions reviewed and appropriate for location? Yes  BMI:     Person(s) involved in teaching:   Patient  : No.   Verified Patient's Phone Number: YES: see chart    Caregiver//  Name: Wife  Phone Number: in chart   Relationship: Wife  Consent to Communicate on file: No  Authorization to Share Protected Health Information- Person to person communication signed by patient and authorized the following person or people:      Motivation Level:  Asks Questions: Yes  Eager to Learn: Yes  Cooperative: Yes  Receptive (willing/able to accept information): Yes  Any cultural factors/Temple beliefs that may influence understanding or compliance? No     Patient demonstrates understanding of the following:  Reason for the appointment, diagnosis and treatment plan: Yes  Knowledge of proper use of medications and conditions for which they are ordered (with special attention to potential side effects or drug interactions): Yes  Which situations necessitate calling provider and whom to contact: Yes     Teaching Concerns Addressed:   Proper use and care of medical equip, care aids, etc.: Yes  Nutritional needs and diet plan: Yes  Pain management techniques: NA  Wound Care: Yes  How and/when to access community resources: Yes  Need for pre-op with in 30 days: YES, will be done with PCP. I asked them to ensure they go over their daily medications during this visit and discuss what medications need to be stopped before surgery and when. If  you are doing a pre-op with your PCP and they are not within the [facility], I ask them to fax it to our pre-op office. Patient verbalized understanding.       Does patient have difficulty getting a ride to appointments (post-ops, PT/OT): No  Patient's plan after discharge: home with family or spouse     Instructional Materials Used/Given:  two bottles of chlorhexidine soap and a surgery packet given to patient in clinic. Instructed patient to buy or get two 8 ounces bottles of antiseptic surgical soap called 4% CHG. Common name brand of this soap are Hibiclens and Exidine. I told them they can find this at their local pharmacy, clinic or retail store. If they have trouble finding it, I told them to ask their pharmacist to help them find a substitute.   - Important Contact Info/Phone Numbers: emphasizing clinic number [phone] and after hours number [phone]  - Map/location of surgery and follow-up appointments  - Showering instructions  - Stoplight Tool     -Next step: schedule a surgery date.    Florida Dey RN

## 2025-05-21 NOTE — TELEPHONE ENCOUNTER
Phoned patient to schedule surgery with Dr Clark. Patient stated he was just getting to work and would appreciate receiving a [employer] message with a handful of the next available dates. Patient will review his options after work and will reply when he is ready to select a date.

## 2025-05-22 PROBLEM — M25.561 CHRONIC PAIN OF RIGHT KNEE: Status: ACTIVE | Noted: 2024-06-26

## 2025-05-22 PROBLEM — G89.29 CHRONIC PAIN OF RIGHT KNEE: Status: ACTIVE | Noted: 2024-06-26

## 2025-06-17 DIAGNOSIS — I10 ESSENTIAL HYPERTENSION: ICD-10-CM

## 2025-06-17 RX ORDER — LOSARTAN POTASSIUM 100 MG/1
100 TABLET ORAL DAILY
Qty: 30 TABLET | Refills: 0 | Status: SHIPPED | OUTPATIENT
Start: 2025-06-17

## 2025-06-22 ENCOUNTER — HEALTH MAINTENANCE LETTER (OUTPATIENT)
Age: 65
End: 2025-06-22

## 2025-06-25 ENCOUNTER — OFFICE VISIT (OUTPATIENT)
Dept: FAMILY MEDICINE | Facility: CLINIC | Age: 65
End: 2025-06-25
Payer: COMMERCIAL

## 2025-06-25 ENCOUNTER — ORDERS ONLY (AUTO-RELEASED) (OUTPATIENT)
Dept: FAMILY MEDICINE | Facility: CLINIC | Age: 65
End: 2025-06-25

## 2025-06-25 VITALS
OXYGEN SATURATION: 98 % | TEMPERATURE: 97.7 F | HEART RATE: 63 BPM | HEIGHT: 68 IN | WEIGHT: 171 LBS | SYSTOLIC BLOOD PRESSURE: 132 MMHG | DIASTOLIC BLOOD PRESSURE: 84 MMHG | BODY MASS INDEX: 25.91 KG/M2 | RESPIRATION RATE: 16 BRPM

## 2025-06-25 DIAGNOSIS — Z01.818 PREOP GENERAL PHYSICAL EXAM: ICD-10-CM

## 2025-06-25 DIAGNOSIS — I10 ESSENTIAL HYPERTENSION: ICD-10-CM

## 2025-06-25 DIAGNOSIS — S83.231D COMPLEX TEAR OF MEDIAL MENISCUS OF RIGHT KNEE, UNSPECIFIED WHETHER OLD OR CURRENT TEAR, SUBSEQUENT ENCOUNTER: ICD-10-CM

## 2025-06-25 DIAGNOSIS — Z13.0 SCREENING FOR DEFICIENCY ANEMIA: ICD-10-CM

## 2025-06-25 DIAGNOSIS — E78.00 PURE HYPERCHOLESTEROLEMIA: ICD-10-CM

## 2025-06-25 DIAGNOSIS — Z12.11 SCREEN FOR COLON CANCER: Primary | ICD-10-CM

## 2025-06-25 DIAGNOSIS — Z13.1 SCREENING FOR DIABETES MELLITUS: ICD-10-CM

## 2025-06-25 DIAGNOSIS — Z12.5 SCREENING FOR PROSTATE CANCER: ICD-10-CM

## 2025-06-25 DIAGNOSIS — Z12.11 SCREEN FOR COLON CANCER: ICD-10-CM

## 2025-06-25 LAB
ALBUMIN SERPL BCG-MCNC: 4.3 G/DL (ref 3.5–5.2)
ALP SERPL-CCNC: 67 U/L (ref 40–150)
ALT SERPL W P-5'-P-CCNC: 19 U/L (ref 0–70)
ANION GAP SERPL CALCULATED.3IONS-SCNC: 10 MMOL/L (ref 7–15)
AST SERPL W P-5'-P-CCNC: 24 U/L (ref 0–45)
BILIRUB SERPL-MCNC: 0.7 MG/DL
BUN SERPL-MCNC: 18.5 MG/DL (ref 8–23)
CALCIUM SERPL-MCNC: 8.7 MG/DL (ref 8.8–10.4)
CHLORIDE SERPL-SCNC: 102 MMOL/L (ref 98–107)
CHOLEST SERPL-MCNC: 240 MG/DL
CREAT SERPL-MCNC: 0.74 MG/DL (ref 0.67–1.17)
EGFRCR SERPLBLD CKD-EPI 2021: >90 ML/MIN/1.73M2
ERYTHROCYTE [DISTWIDTH] IN BLOOD BY AUTOMATED COUNT: 11.7 % (ref 10–15)
EST. AVERAGE GLUCOSE BLD GHB EST-MCNC: 103 MG/DL
FASTING STATUS PATIENT QL REPORTED: YES
FASTING STATUS PATIENT QL REPORTED: YES
GLUCOSE SERPL-MCNC: 102 MG/DL (ref 70–99)
HBA1C MFR BLD: 5.2 % (ref 0–5.6)
HCO3 SERPL-SCNC: 25 MMOL/L (ref 22–29)
HCT VFR BLD AUTO: 42.6 % (ref 40–53)
HDLC SERPL-MCNC: 102 MG/DL
HGB BLD-MCNC: 14.5 G/DL (ref 13.3–17.7)
LDLC SERPL CALC-MCNC: 129 MG/DL
MCH RBC QN AUTO: 30.2 PG (ref 26.5–33)
MCHC RBC AUTO-ENTMCNC: 34 G/DL (ref 31.5–36.5)
MCV RBC AUTO: 89 FL (ref 78–100)
NONHDLC SERPL-MCNC: 138 MG/DL
PLATELET # BLD AUTO: 209 10E3/UL (ref 150–450)
POTASSIUM SERPL-SCNC: 4.3 MMOL/L (ref 3.4–5.3)
PROT SERPL-MCNC: 7 G/DL (ref 6.4–8.3)
PSA SERPL DL<=0.01 NG/ML-MCNC: 1.55 NG/ML (ref 0–4.5)
RBC # BLD AUTO: 4.8 10E6/UL (ref 4.4–5.9)
SODIUM SERPL-SCNC: 137 MMOL/L (ref 135–145)
TRIGL SERPL-MCNC: 46 MG/DL
WBC # BLD AUTO: 4.8 10E3/UL (ref 4–11)

## 2025-06-25 PROCEDURE — 36415 COLL VENOUS BLD VENIPUNCTURE: CPT | Performed by: FAMILY MEDICINE

## 2025-06-25 PROCEDURE — G2211 COMPLEX E/M VISIT ADD ON: HCPCS | Performed by: FAMILY MEDICINE

## 2025-06-25 PROCEDURE — G0103 PSA SCREENING: HCPCS | Performed by: FAMILY MEDICINE

## 2025-06-25 PROCEDURE — 80053 COMPREHEN METABOLIC PANEL: CPT | Performed by: FAMILY MEDICINE

## 2025-06-25 PROCEDURE — 3079F DIAST BP 80-89 MM HG: CPT | Performed by: FAMILY MEDICINE

## 2025-06-25 PROCEDURE — 3044F HG A1C LEVEL LT 7.0%: CPT | Performed by: FAMILY MEDICINE

## 2025-06-25 PROCEDURE — 85027 COMPLETE CBC AUTOMATED: CPT | Performed by: FAMILY MEDICINE

## 2025-06-25 PROCEDURE — 90677 PCV20 VACCINE IM: CPT | Performed by: FAMILY MEDICINE

## 2025-06-25 PROCEDURE — 1125F AMNT PAIN NOTED PAIN PRSNT: CPT | Performed by: FAMILY MEDICINE

## 2025-06-25 PROCEDURE — 80061 LIPID PANEL: CPT | Performed by: FAMILY MEDICINE

## 2025-06-25 PROCEDURE — 3075F SYST BP GE 130 - 139MM HG: CPT | Performed by: FAMILY MEDICINE

## 2025-06-25 PROCEDURE — 99215 OFFICE O/P EST HI 40 MIN: CPT | Mod: 25 | Performed by: FAMILY MEDICINE

## 2025-06-25 PROCEDURE — 90471 IMMUNIZATION ADMIN: CPT | Performed by: FAMILY MEDICINE

## 2025-06-25 PROCEDURE — 83036 HEMOGLOBIN GLYCOSYLATED A1C: CPT | Performed by: FAMILY MEDICINE

## 2025-06-25 PROCEDURE — 90715 TDAP VACCINE 7 YRS/> IM: CPT | Performed by: FAMILY MEDICINE

## 2025-06-25 PROCEDURE — 90472 IMMUNIZATION ADMIN EACH ADD: CPT | Performed by: FAMILY MEDICINE

## 2025-06-25 RX ORDER — LOSARTAN POTASSIUM 100 MG/1
100 TABLET ORAL DAILY
Qty: 90 TABLET | Refills: 3 | Status: SHIPPED | OUTPATIENT
Start: 2025-06-25

## 2025-06-25 ASSESSMENT — PAIN SCALES - GENERAL: PAINLEVEL_OUTOF10: MILD PAIN (1)

## 2025-06-25 NOTE — PATIENT INSTRUCTIONS
How to Take Your Medication Before Surgery  Preoperative Medication Instructions   Antiplatelet or Anticoagulation Medication Instructions   - We reviewed the medication list and the patient is not on an antiplatelet or anticoagulation medications.    Additional Medication Instructions   - ACE/ARB/ARNI (lisinopril, enalapril, losartan, valsartan, olmesartan, sacubritril/valsartan) : DO NOT TAKE on day of surgery (minimum 11 hours for general anesthesia).   - ibuprofen (Advil, Motrin): DO NOT TAKE 1 day before surgery.        Patient Education   Preparing for Your Surgery  For Adults  Getting started  In most cases, a nurse will call to review your health history and instructions. They will give you an arrival time based on your scheduled surgery time. Please be ready to share:  Your doctor's clinic name and phone number  Your medical, surgical, and anesthesia history  A list of allergies and sensitivities  A list of medicines, including herbal treatments and over-the-counter drugs  Whether the patient has a legal guardian (ask how to send us the papers in advance)  Note: You may not receive a call if you were seen at our PAC (Preoperative Assessment Center).  Please tell us if you're pregnant--or if there's any chance you might be pregnant. Some surgeries may injure a fetus (unborn baby), so they require a pregnancy test. Surgeries that are safe for a fetus don't always need a test, and you can choose whether to have one.   Preparing for surgery  Within 10 to 30 days of surgery: Have a pre-op exam (sometimes called an H&P, or History and Physical). This can be done at a clinic or pre-operative center.  If you're having a , you may not need this exam. Talk to your care team.  At your pre-op exam, talk to your care team about all medicines you take. (This includes CBD oil and any drugs, such as THC, marijuana, and other forms of cannabis.) If you need to stop any medicine before surgery, ask when to start  taking it again.  This is for your safety. Many medicines and drugs can make you bleed too much during surgery. Some change how well surgery (anesthesia) drugs work.  Call your insurance company to let them know you're having surgery. (If you don't have insurance, call [phone].)  Call your clinic if there's any change in your health. This includes a scrape or scratch near the surgery site, or any signs of a cold (sore throat, runny nose, cough, rash, fever).  Eating and drinking guidelines  For your safety: Unless your surgeon tells you otherwise, follow the guidelines below.  Eat and drink as normal until 8 hours before you arrive for surgery. After that, no food or milk. You can spit out gum when you arrive.  Drink clear liquids until 2 hours before you arrive. These are liquids you can see through, like water, Gatorade, and Propel Water. They also include plain black coffee and tea (no cream or milk).  No alcohol for 24 hours before you arrive. The night before surgery, stop any drinks that contain THC.  If your care team tells you to take medicine on the morning of surgery, it's okay to take it with a sip of water. No other medicines or drugs are allowed (including CBD oil)--follow your care team's instructions.  If you have questions the day of surgery, call your hospital or surgery center.   Preventing infection  Shower or bathe the night before and the morning of surgery. Follow the instructions your clinic gave you. (If no instructions, use regular soap.)  Don't shave or clip hair near your surgery site. We'll remove the hair if needed.  Don't smoke or vape the morning of surgery. No chewing tobacco for 6 hours before you arrive. A nicotine patch is okay. You may spit out nicotine gum when you arrive.  For some surgeries, the surgeon will tell you to fully quit smoking and nicotine.  We will make every effort to keep you safe from infection. We will:  Clean our hands often with soap and water (or an  alcohol-based hand rub).  Clean the skin at your surgery site with a special soap that kills germs.  Give you a special gown to keep you warm. (Cold raises the risk of infection.)  Wear hair covers, masks, gowns, and gloves during surgery.  Give antibiotic medicine, if prescribed. Not all surgeries need this medicine.  What to bring on the day of surgery  Photo ID and insurance card  Copy of your health care directive, if you have one  Glasses and hearing aids (bring cases)  You can't wear contacts during surgery  Inhaler and eye drops, if you use them (tell us about these when you arrive)  CPAP machine or breathing device, if you use them  A few personal items, if spending the night  If you have . . .  A pacemaker, ICD (cardiac defibrillator), or other implant: Bring the ID card.  An implanted stimulator: Bring the remote control.  A legal guardian: Bring a copy of the certified (court-stamped) guardianship papers.  Please remove any jewelry, including body piercings. Leave jewelry and other valuables at home.  If you're going home the day of surgery  You must have a support person drive you home. They should stay with you overnight, and they may need to help with your self-care.  If you don't have a support person, please tells us as soon as possible. We can help.  After surgery  If it's hard to control your pain or you need more pain medicine, please call your surgeon's office.  Questions?   If you have any questions for your care team, list them here:   ____________________________________________________________________________________________________________________________________________________________________________________________________________________________________________________________  For informational purposes only. Not to replace the advice of your health care provider. Copyright   2003, 2019 [facility]. All rights reserved. Clinically reviewed by Mikel Dow MD.  [employer] [id number] - REV 02/25.

## 2025-06-25 NOTE — PROGRESS NOTES
Preoperative Evaluation  [facility]  [address], [address]  [facility] [address]  Phone: [phone]  Primary Provider: Wilber Raymond DO  Pre-op Performing Provider: Wilber Raymond DO  Jun 25, 2025 6/24/2025   Surgical Information   What procedure is being done? Arthroscopic right knee meniscus surgery   Facility or Hospital where procedure/surgery will be performed: [facility]   Who is doing the procedure / surgery? Dr. Sin Clark   Date of surgery / procedure: July 15, 2025   Time of surgery / procedure: Not sure   Where do you plan to recover after surgery? at home with family     Fax number for surgical facility: Note does not need to be faxed, will be available electronically in Epic.    Assessment & Plan     The proposed surgical procedure is considered INTERMEDIATE risk.    Preop general physical exam    Complex tear of medial meniscus of right knee, unspecified whether old or current tear, subsequent encounter  He is cleared to proceed with arthroscopic knee surgery for the meniscus tear as scheduled.    Essential hypertension  Blood pressure is stable on losartan.  - losartan (COZAAR) 100 MG tablet; Take 1 tablet (100 mg) by mouth daily.  - Comprehensive metabolic panel (BMP + Alb, Alk Phos, ALT, AST, Total. Bili, TP); Future    Pure hypercholesterolemia  Last year his LDL cholesterol was in the 120s.  There is a strong family history of coronary artery disease.  His younger brother recently had to have stents placed.  I recommended that we check a CT coronary calcium scan to further assess his risk.  - CT Coronary Calcium Scan; Future  - Lipid Profile (Chol, Trig, HDL, LDL calc); Future    Screen for colon cancer  - COLOGUARD(EXACT SCIENCES); Future    Screening for diabetes mellitus  - Hemoglobin A1c; Future    Screening for prostate cancer  - PSA, screen; Future    Screening for deficiency anemia  - CBC with  platelets; Future    The longitudinal plan of care for the diagnosis(es)/condition(s) as documented were addressed during this visit. Due to the added complexity in care, I will continue to support Edward in the subsequent management and with ongoing continuity of care.      40 minutes spent by me on the date of the encounter doing chart review, review of test results, interpretation of tests, patient visit, and documentation         - No identified additional risk factors other than previously addressed    Preoperative Medication Instructions  Antiplatelet or Anticoagulation Medication Instructions   - We reviewed the medication list and the patient is not on an antiplatelet or anticoagulation medications.    Additional Medication Instructions   - ACE/ARB/ARNI (lisinopril, enalapril, losartan, valsartan, olmesartan, sacubritril/valsartan) : DO NOT TAKE on day of surgery (minimum 11 hours for general anesthesia).   - ibuprofen (Advil, Motrin): DO NOT TAKE 1 day before surgery.     Recommendation  Approval given to proceed with proposed procedure, without further diagnostic evaluation.        Subjective   Edward is a 64 year old, presenting for the following:  Pre-Op Exam (Right knee 7/15/25)        HPI: He has had worsening right-sided knee pain symptoms over the past year.  He denies any specific injury.  An MRI showed evidence of a complex right medial meniscus tear.  He also has a history significant for hypertension and hyperlipidemia.  There is a strong family history of coronary artery disease in his father and younger brother.          6/24/2025   Pre-Op Questionnaire   Have you ever had a heart attack or stroke? No   Have you ever had surgery on your heart or blood vessels, such as a stent placement, a coronary artery bypass, or surgery on an artery in your head, neck, heart, or legs? No   Do you have chest pain with activity? No   Do you have a history of heart failure? No   Do you currently have a cold,  bronchitis or symptoms of other infection? No   Do you have a cough, shortness of breath, or wheezing? No   Do you or anyone in your family have previous history of blood clots? No   Do you or does anyone in your family have a serious bleeding problem such as prolonged bleeding following surgeries or cuts? No   Have you ever had problems with anemia or been told to take iron pills? No   Have you had any abnormal blood loss such as black, tarry or bloody stools? No   Have you ever had a blood transfusion? No   Are you willing to have a blood transfusion if it is medically needed before, during, or after your surgery? Yes   Have you or any of your relatives ever had problems with anesthesia? No   Do you have sleep apnea, excessive snoring or daytime drowsiness? No   Do you have any artifical heart valves or other implanted medical devices like a pacemaker, defibrillator, or continuous glucose monitor? No   Do you have artificial joints? No   Are you allergic to latex? No       Preoperative Review of    reviewed - no record of controlled substances prescribed.          Patient Active Problem List    Diagnosis Date Noted    Chronic pain of left knee 06/26/2024     Priority: Medium    Chronic pain of right knee 06/26/2024     Priority: Medium    Neuroma 04/14/2021     Priority: Medium     Added automatically from request for surgery [id number]      Sleep disturbances 12/23/2016     Priority: Medium    Onychomycosis 04/29/2016     Priority: Medium    NO ACTIVE PROBLEMS (aka NONE)      Priority: Medium    CARDIOVASCULAR SCREENING; LDL GOAL LESS THAN 160 06/18/2013     Priority: Medium      Past Medical History:   Diagnosis Date    CARDIOVASCULAR SCREENING; LDL GOAL LESS THAN 160 6/18/2013    NO ACTIVE PROBLEMS (aka NONE)      Past Surgical History:   Procedure Laterality Date    COLONOSCOPY WITH CO2 INSUFFLATION N/A 12/19/2014    Procedure: COLONOSCOPY WITH CO2 INSUFFLATION;  Surgeon: Kalpana Chen MD;  Location:  "[facility] OR    ENT SURGERY      Tonsillectomy    EXCISE DAWN'S NEUROMA FOOT Right 4/27/2021    Procedure: Right foot neuroma removal;  Surgeon: Robert Jacobson DPM;  Location: [facility] OR    NO HISTORY OF SURGERY       Current Outpatient Medications   Medication Sig Dispense Refill    losartan (COZAAR) 100 MG tablet Take 1 tablet (100 mg) by mouth daily. 90 tablet 3    ibuprofen (ADVIL/MOTRIN) 200 MG tablet Take 200 mg by mouth every 4 hours as needed for mild pain         No Known Allergies     Social History     Tobacco Use    Smoking status: Never    Smokeless tobacco: Never   Substance Use Topics    Alcohol use: Yes     Comment: 4 beers over the weekend       History   Drug Use No             Review of Systems  Constitutional, HEENT, cardiovascular, pulmonary, GI, , musculoskeletal, neuro, skin, endocrine and psych systems are negative, except as otherwise noted.    Objective    /84   Pulse 63   Temp 97.7  F (36.5  C) (Temporal)   Resp 16   Ht 1.715 m (5' 7.5\")   Wt 77.6 kg (171 lb)   SpO2 98%   BMI 26.39 kg/m     Estimated body mass index is 26.39 kg/m  as calculated from the following:    Height as of this encounter: 1.715 m (5' 7.5\").    Weight as of this encounter: 77.6 kg (171 lb).  Physical Exam  GENERAL: alert and no distress  EYES: Eyes grossly normal to inspection, PERRL and conjunctivae and sclerae normal  HENT: ear canals and TM's normal, nose and mouth without ulcers or lesions  NECK: no adenopathy, no asymmetry, masses, or scars  RESP: lungs clear to auscultation - no rales, rhonchi or wheezes  CV: regular rate and rhythm, normal S1 S2, no S3 or S4, no murmur, click or rub, no peripheral edema  ABDOMEN: soft, nontender, no hepatosplenomegaly, no masses and bowel sounds normal  MS: no gross musculoskeletal defects noted, no edema.  Yenni's, varus, valgus and Lachman's testing negative bilaterally.  SKIN: no suspicious lesions or rashes  NEURO: Normal strength and tone, mentation intact and " speech normal  PSYCH: mentation appears normal, affect normal/bright    Recent Labs   Lab Test 07/15/24  1444      POTASSIUM 4.2   CR 0.82        Diagnostics  Labs pending at this time.  Results will be reviewed when available.   No EKG required, no history of coronary heart disease, significant arrhythmia, peripheral arterial disease or other structural heart disease.    Revised Cardiac Risk Index (RCRI)  The patient has the following serious cardiovascular risks for perioperative complications:   - No serious cardiac risks = 0 points     RCRI Interpretation: 0 points: Class I (very low risk - 0.4% complication rate)         Signed Electronically by: Wilber Raymond DO  A copy of this evaluation report is provided to the requesting physician.

## 2025-06-25 NOTE — NURSING NOTE
Patient received Prevnar 20 vaccine and TDAP vaccine per Dr. Ladi Raymond's orders. Patient given VIS form(s) prior to immunization administration.   Reviewed patient's allergies, temperature WNL.  Prior to immunization administration, this RN verified patient's identity by having patient verbalize first and last name and date of birth.   Patient was instructed to remain in clinic for 15 minutes afterwards and to report any adverse reactions.     - Scott MEZA RN

## 2025-06-26 ENCOUNTER — TELEPHONE (OUTPATIENT)
Dept: ORTHOPEDICS | Facility: CLINIC | Age: 65
End: 2025-06-26
Payer: COMMERCIAL

## 2025-06-26 ENCOUNTER — RESULTS FOLLOW-UP (OUTPATIENT)
Dept: FAMILY MEDICINE | Facility: CLINIC | Age: 65
End: 2025-06-26

## 2025-06-26 DIAGNOSIS — S83.231D COMPLEX TEAR OF MEDIAL MENISCUS OF RIGHT KNEE AS CURRENT INJURY, SUBSEQUENT ENCOUNTER: Primary | ICD-10-CM

## 2025-07-02 ENCOUNTER — TELEPHONE (OUTPATIENT)
Dept: FAMILY MEDICINE | Facility: CLINIC | Age: 65
End: 2025-07-02
Payer: COMMERCIAL

## 2025-07-02 NOTE — TELEPHONE ENCOUNTER
"Patient is scheduled on 25 for a CT calcium screening.  Insurance requires a PA.    Spoke w/ Bessie from [employer] to initiate PA.   Phone # [phone].   [employer] needed the following information:  DE's NPI  CPT code for imaging  Address of imaging location  Patient's  and insurance #    DE,   [employer] is asking for additional information for prior authorization request for the CT calcium screening.  Could you possibly call and do a provider [id number]?  Their phone number to call is [phone].  The case may close on 25 or earlier.   If the case is denied, we have 10 days from the denial date to appeal.  Case # [id number].     Thanks!  Nora CARDENAS,          Team,  Please reach out to patient's insurance for additional information on what is needed  Thanks,  Viola ROSALES RN   ----- Message -----   From: Bekah Vanessa   Sent: 2025   4:17 PM CDT   To: [facility]   Subject: FW: CT Coranary Calcium Scan                      Triage ,  Is there anyone else that helps getting this covered?   ----- Message -----   From: Maxwell Corcoran   Sent: 2025   3:18 PM CDT   To: Nora Garcia; [facility]   Subject: FW: CT Coranary Calcium Scan                      Hello,    CT Calcium screening is not in scope with the CPA team at this time. Service not covered by the CPA team will fall to the responsibility of the facility or clinic.    Maxwell Corcoran   ----- Message -----   From: Nora Garcia   Sent: 2025   3:05 PM CDT   To: [facility]   Subject: FW: CT Coranary Calcium Scan                         ----- Message -----   From: Cecilia Rader   Sent: 2025   6:52 AM CDT   To: [facility]   Subject: FW: CT Coranary Calcium Scan                         ----- Message -----   From: Isacc Louis \"Edward\"   Sent: 2025   1:06 PM CDT   To: Justin Castillo   Subject: CT Coranary Calcium Scan                          Topic: " Release of Medical Records Question.      Good afternoon,      I just talked to my insurer, [employer].   They require a Prior Authorization for the CT Calcium Scan that Dr. Wilber Bledsoe recommended during my visit on 06/25/25.   Can someone kindly send that to [employer]?      My best,      Edward Louis

## 2025-07-09 RX ORDER — INFLUENZA A VIRUS A/VICTORIA/4897/2022 IVR-238 (H1N1) ANTIGEN (PROPIOLACTONE INACTIVATED), INFLUENZA A VIRUS A/THAILAND/8/2022 IVR-237 (H3N2) ANTIGEN (PROPIOLACTONE INACTIVATED), INFLUENZA B VIRUS B/AUSTRIA/1359417/2021 BVR-26 ANTIGEN (PROPIOLACTONE INACTIVATED) 15; 15; 15 UG/.5ML; UG/.5ML; UG/.5ML
INJECTION, SUSPENSION INTRAMUSCULAR
COMMUNITY
Start: 2024-10-08

## 2025-07-09 NOTE — PROGRESS NOTES
Chief Complaint:   Follow up, DOS 7/15/25 with Dr. Clark    Procedures:  Examination under anesthesia, right knee   Right  knee arthroscopy, partial medial meniscectomy     History:  Isacc Louis is a 64 year old patient status post above procedure, here for follow-up.  He has done very well since surgery.  Pain is controlled without opioid pain medication, taking Tylenol as needed.  He is advancing activity on his own, he did preop physical therapy and has a home exercise program.  Does endorse anterior knee soreness with increased activity, otherwise pain is well-controlled.  No specific questions or concerns today.      Exam:     General: Awake, Alert, and oriented. Articulates and communicates with a normal affect     Right lower Extremity:  Right incisions well healed without evidence of infection, no erythema, drainage, or wound dehiscence   Normal post-operative effusion and ecchymosis  Range of motion and stability exam not performed  TA/Gsc/EHL/FHL with 5/5 strength  Distal pulses palpable, toes are warm and well perfused   Gait: Near symmetric without assistive device    Imaging:  No new imaging    Medications:     Current Outpatient Medications:     ibuprofen (ADVIL/MOTRIN) 200 MG tablet, Take 200 mg by mouth every 4 hours as needed for mild pain, Disp: , Rfl:     losartan (COZAAR) 100 MG tablet, Take 1 tablet (100 mg) by mouth daily., Disp: 90 tablet, Rfl: 3    Assessment:  Doing well 1 week s/p right knee arthroscopy, partial medial meniscectomy with Dr. Clark. Basic wound cares were performed today.  Nylon sutures were removed, I did not appreciate signs of infection. We discussed the plan below, all questions were answered to the best of my ability.     Plan:   -Weight bearing: WBAT   -Range of motion as tolerated   -DVT prophylaxis: ASA 81 mg BID x 4 weeks  -Follow up: 6 weeks with Dr. Clark, no XR necessary    Lena Ha PA-C 7/9/2025 12:42 PM  Orthopedic Surgery

## 2025-07-14 ENCOUNTER — ANESTHESIA EVENT (OUTPATIENT)
Dept: SURGERY | Facility: AMBULATORY SURGERY CENTER | Age: 65
End: 2025-07-14
Payer: COMMERCIAL

## 2025-07-15 ENCOUNTER — HOSPITAL ENCOUNTER (OUTPATIENT)
Facility: AMBULATORY SURGERY CENTER | Age: 65
Discharge: HOME | End: 2025-07-15
Attending: ORTHOPAEDIC SURGERY
Payer: COMMERCIAL

## 2025-07-15 ENCOUNTER — ANESTHESIA (OUTPATIENT)
Dept: SURGERY | Facility: AMBULATORY SURGERY CENTER | Age: 65
End: 2025-07-15
Payer: COMMERCIAL

## 2025-07-15 VITALS
HEART RATE: 65 BPM | HEIGHT: 68 IN | RESPIRATION RATE: 16 BRPM | BODY MASS INDEX: 25.01 KG/M2 | SYSTOLIC BLOOD PRESSURE: 128 MMHG | OXYGEN SATURATION: 96 % | TEMPERATURE: 96.9 F | WEIGHT: 165 LBS | DIASTOLIC BLOOD PRESSURE: 87 MMHG

## 2025-07-15 DIAGNOSIS — M25.561 CHRONIC PAIN OF RIGHT KNEE: ICD-10-CM

## 2025-07-15 DIAGNOSIS — G89.29 CHRONIC PAIN OF RIGHT KNEE: ICD-10-CM

## 2025-07-15 PROCEDURE — 29881 ARTHRS KNE SRG MNISECTMY M/L: CPT | Mod: RT | Performed by: ORTHOPAEDIC SURGERY

## 2025-07-15 PROCEDURE — 29881 ARTHRS KNE SRG MNISECTMY M/L: CPT | Mod: RT

## 2025-07-15 RX ORDER — DEXAMETHASONE SODIUM PHOSPHATE 4 MG/ML
INJECTION, SOLUTION INTRA-ARTICULAR; INTRALESIONAL; INTRAMUSCULAR; INTRAVENOUS; SOFT TISSUE PRN
Status: DISCONTINUED | OUTPATIENT
Start: 2025-07-15 | End: 2025-07-15

## 2025-07-15 RX ORDER — CEFAZOLIN SODIUM 2 G/50ML
2 SOLUTION INTRAVENOUS
Status: COMPLETED | OUTPATIENT
Start: 2025-07-15 | End: 2025-07-15

## 2025-07-15 RX ORDER — HYDROMORPHONE HYDROCHLORIDE 1 MG/ML
0.2 INJECTION, SOLUTION INTRAMUSCULAR; INTRAVENOUS; SUBCUTANEOUS EVERY 5 MIN PRN
Status: DISCONTINUED | OUTPATIENT
Start: 2025-07-15 | End: 2025-07-16 | Stop reason: HOSPADM

## 2025-07-15 RX ORDER — ONDANSETRON 4 MG/1
4 TABLET, ORALLY DISINTEGRATING ORAL EVERY 30 MIN PRN
Status: DISCONTINUED | OUTPATIENT
Start: 2025-07-15 | End: 2025-07-16 | Stop reason: HOSPADM

## 2025-07-15 RX ORDER — ONDANSETRON 2 MG/ML
4 INJECTION INTRAMUSCULAR; INTRAVENOUS EVERY 30 MIN PRN
Status: DISCONTINUED | OUTPATIENT
Start: 2025-07-15 | End: 2025-07-16 | Stop reason: HOSPADM

## 2025-07-15 RX ORDER — OXYCODONE HYDROCHLORIDE 5 MG/1
5-10 TABLET ORAL EVERY 4 HOURS PRN
Qty: 10 TABLET | Refills: 0 | Status: SHIPPED | OUTPATIENT
Start: 2025-07-15

## 2025-07-15 RX ORDER — FENTANYL CITRATE 50 UG/ML
25 INJECTION, SOLUTION INTRAMUSCULAR; INTRAVENOUS EVERY 5 MIN PRN
Status: DISCONTINUED | OUTPATIENT
Start: 2025-07-15 | End: 2025-07-16 | Stop reason: HOSPADM

## 2025-07-15 RX ORDER — ONDANSETRON 2 MG/ML
INJECTION INTRAMUSCULAR; INTRAVENOUS PRN
Status: DISCONTINUED | OUTPATIENT
Start: 2025-07-15 | End: 2025-07-15

## 2025-07-15 RX ORDER — ACETAMINOPHEN 325 MG/1
650 TABLET ORAL
Status: DISCONTINUED | OUTPATIENT
Start: 2025-07-15 | End: 2025-07-16 | Stop reason: HOSPADM

## 2025-07-15 RX ORDER — ACETAMINOPHEN 325 MG/1
975 TABLET ORAL ONCE
Status: COMPLETED | OUTPATIENT
Start: 2025-07-15 | End: 2025-07-15

## 2025-07-15 RX ORDER — SODIUM CHLORIDE, SODIUM LACTATE, POTASSIUM CHLORIDE, CALCIUM CHLORIDE 600; 310; 30; 20 MG/100ML; MG/100ML; MG/100ML; MG/100ML
INJECTION, SOLUTION INTRAVENOUS CONTINUOUS
Status: DISCONTINUED | OUTPATIENT
Start: 2025-07-15 | End: 2025-07-15 | Stop reason: HOSPADM

## 2025-07-15 RX ORDER — BUPIVACAINE HCL/EPINEPHRINE 0.25-.0005
VIAL (ML) INJECTION PRN
Status: DISCONTINUED | OUTPATIENT
Start: 2025-07-15 | End: 2025-07-15 | Stop reason: HOSPADM

## 2025-07-15 RX ORDER — OXYCODONE HYDROCHLORIDE 5 MG/1
10 TABLET ORAL
Status: DISCONTINUED | OUTPATIENT
Start: 2025-07-15 | End: 2025-07-16 | Stop reason: HOSPADM

## 2025-07-15 RX ORDER — FENTANYL CITRATE 50 UG/ML
25 INJECTION, SOLUTION INTRAMUSCULAR; INTRAVENOUS
Status: DISCONTINUED | OUTPATIENT
Start: 2025-07-15 | End: 2025-07-16 | Stop reason: HOSPADM

## 2025-07-15 RX ORDER — DEXAMETHASONE SODIUM PHOSPHATE 10 MG/ML
4 INJECTION, SOLUTION INTRAMUSCULAR; INTRAVENOUS
Status: DISCONTINUED | OUTPATIENT
Start: 2025-07-15 | End: 2025-07-16 | Stop reason: HOSPADM

## 2025-07-15 RX ORDER — PROPOFOL 10 MG/ML
INJECTION, EMULSION INTRAVENOUS PRN
Status: DISCONTINUED | OUTPATIENT
Start: 2025-07-15 | End: 2025-07-15

## 2025-07-15 RX ORDER — OXYCODONE HYDROCHLORIDE 5 MG/1
5 TABLET ORAL
Status: DISCONTINUED | OUTPATIENT
Start: 2025-07-15 | End: 2025-07-16 | Stop reason: HOSPADM

## 2025-07-15 RX ORDER — FENTANYL CITRATE 50 UG/ML
50 INJECTION, SOLUTION INTRAMUSCULAR; INTRAVENOUS EVERY 5 MIN PRN
Status: DISCONTINUED | OUTPATIENT
Start: 2025-07-15 | End: 2025-07-16 | Stop reason: HOSPADM

## 2025-07-15 RX ORDER — SODIUM CHLORIDE, SODIUM LACTATE, POTASSIUM CHLORIDE, CALCIUM CHLORIDE 600; 310; 30; 20 MG/100ML; MG/100ML; MG/100ML; MG/100ML
INJECTION, SOLUTION INTRAVENOUS CONTINUOUS PRN
Status: DISCONTINUED | OUTPATIENT
Start: 2025-07-15 | End: 2025-07-15

## 2025-07-15 RX ORDER — SODIUM CHLORIDE, SODIUM LACTATE, POTASSIUM CHLORIDE, CALCIUM CHLORIDE 600; 310; 30; 20 MG/100ML; MG/100ML; MG/100ML; MG/100ML
INJECTION, SOLUTION INTRAVENOUS CONTINUOUS
Status: DISCONTINUED | OUTPATIENT
Start: 2025-07-15 | End: 2025-07-16 | Stop reason: HOSPADM

## 2025-07-15 RX ORDER — LABETALOL HYDROCHLORIDE 5 MG/ML
10 INJECTION, SOLUTION INTRAVENOUS
Status: DISCONTINUED | OUTPATIENT
Start: 2025-07-15 | End: 2025-07-16 | Stop reason: HOSPADM

## 2025-07-15 RX ORDER — AMOXICILLIN 250 MG
1-2 CAPSULE ORAL 2 TIMES DAILY
Qty: 30 TABLET | Refills: 0 | Status: SHIPPED | OUTPATIENT
Start: 2025-07-15

## 2025-07-15 RX ORDER — PROPOFOL 10 MG/ML
INJECTION, EMULSION INTRAVENOUS CONTINUOUS PRN
Status: DISCONTINUED | OUTPATIENT
Start: 2025-07-15 | End: 2025-07-15

## 2025-07-15 RX ORDER — HYDROXYZINE HYDROCHLORIDE 25 MG/1
25 TABLET, FILM COATED ORAL
Status: DISCONTINUED | OUTPATIENT
Start: 2025-07-15 | End: 2025-07-16 | Stop reason: HOSPADM

## 2025-07-15 RX ORDER — CEFAZOLIN SODIUM 2 G/50ML
2 SOLUTION INTRAVENOUS SEE ADMIN INSTRUCTIONS
Status: DISCONTINUED | OUTPATIENT
Start: 2025-07-15 | End: 2025-07-15 | Stop reason: HOSPADM

## 2025-07-15 RX ORDER — FENTANYL CITRATE 50 UG/ML
INJECTION, SOLUTION INTRAMUSCULAR; INTRAVENOUS PRN
Status: DISCONTINUED | OUTPATIENT
Start: 2025-07-15 | End: 2025-07-15

## 2025-07-15 RX ORDER — HYDROXYZINE HYDROCHLORIDE 25 MG/1
25 TABLET, FILM COATED ORAL 3 TIMES DAILY PRN
Qty: 20 TABLET | Refills: 0 | Status: SHIPPED | OUTPATIENT
Start: 2025-07-15

## 2025-07-15 RX ORDER — HYDROMORPHONE HYDROCHLORIDE 1 MG/ML
0.4 INJECTION, SOLUTION INTRAMUSCULAR; INTRAVENOUS; SUBCUTANEOUS EVERY 5 MIN PRN
Status: DISCONTINUED | OUTPATIENT
Start: 2025-07-15 | End: 2025-07-16 | Stop reason: HOSPADM

## 2025-07-15 RX ORDER — GLYCOPYRROLATE 0.2 MG/ML
INJECTION, SOLUTION INTRAMUSCULAR; INTRAVENOUS PRN
Status: DISCONTINUED | OUTPATIENT
Start: 2025-07-15 | End: 2025-07-15

## 2025-07-15 RX ORDER — ASPIRIN 81 MG/1
81 TABLET ORAL 2 TIMES DAILY
Qty: 60 TABLET | Refills: 0 | Status: SHIPPED | OUTPATIENT
Start: 2025-07-15

## 2025-07-15 RX ORDER — NALOXONE HYDROCHLORIDE 0.4 MG/ML
0.1 INJECTION, SOLUTION INTRAMUSCULAR; INTRAVENOUS; SUBCUTANEOUS
Status: DISCONTINUED | OUTPATIENT
Start: 2025-07-15 | End: 2025-07-16 | Stop reason: HOSPADM

## 2025-07-15 RX ORDER — OXYCODONE HYDROCHLORIDE 5 MG/1
5 TABLET ORAL
Status: COMPLETED | OUTPATIENT
Start: 2025-07-15 | End: 2025-07-15

## 2025-07-15 RX ORDER — ACETAMINOPHEN 325 MG/1
650 TABLET ORAL EVERY 4 HOURS PRN
Qty: 50 TABLET | Refills: 0 | Status: SHIPPED | OUTPATIENT
Start: 2025-07-15

## 2025-07-15 RX ORDER — LIDOCAINE 40 MG/G
CREAM TOPICAL
Status: DISCONTINUED | OUTPATIENT
Start: 2025-07-15 | End: 2025-07-15 | Stop reason: HOSPADM

## 2025-07-15 RX ORDER — MEPERIDINE HYDROCHLORIDE 25 MG/ML
12.5 INJECTION INTRAMUSCULAR; INTRAVENOUS; SUBCUTANEOUS EVERY 5 MIN PRN
Status: DISCONTINUED | OUTPATIENT
Start: 2025-07-15 | End: 2025-07-16 | Stop reason: HOSPADM

## 2025-07-15 RX ORDER — LIDOCAINE HYDROCHLORIDE 20 MG/ML
INJECTION, SOLUTION INFILTRATION; PERINEURAL PRN
Status: DISCONTINUED | OUTPATIENT
Start: 2025-07-15 | End: 2025-07-15

## 2025-07-15 RX ORDER — ONDANSETRON 4 MG/1
4 TABLET, ORALLY DISINTEGRATING ORAL EVERY 8 HOURS PRN
Qty: 4 TABLET | Refills: 0 | Status: SHIPPED | OUTPATIENT
Start: 2025-07-15

## 2025-07-15 RX ADMIN — GLYCOPYRROLATE 0.2 MG: 0.2 INJECTION, SOLUTION INTRAMUSCULAR; INTRAVENOUS at 11:49

## 2025-07-15 RX ADMIN — LIDOCAINE HYDROCHLORIDE 100 MG: 20 INJECTION, SOLUTION INFILTRATION; PERINEURAL at 11:56

## 2025-07-15 RX ADMIN — Medication 150 MCG: at 12:46

## 2025-07-15 RX ADMIN — FENTANYL CITRATE 25 MCG: 50 INJECTION, SOLUTION INTRAMUSCULAR; INTRAVENOUS at 12:38

## 2025-07-15 RX ADMIN — Medication 0.5 MG: at 12:39

## 2025-07-15 RX ADMIN — ONDANSETRON 4 MG: 2 INJECTION INTRAMUSCULAR; INTRAVENOUS at 12:46

## 2025-07-15 RX ADMIN — SODIUM CHLORIDE, SODIUM LACTATE, POTASSIUM CHLORIDE, CALCIUM CHLORIDE: 600; 310; 30; 20 INJECTION, SOLUTION INTRAVENOUS at 11:48

## 2025-07-15 RX ADMIN — CEFAZOLIN SODIUM 2 G: 2 SOLUTION INTRAVENOUS at 11:51

## 2025-07-15 RX ADMIN — PROPOFOL 150 MCG/KG/MIN: 10 INJECTION, EMULSION INTRAVENOUS at 11:56

## 2025-07-15 RX ADMIN — FENTANYL CITRATE 25 MCG: 50 INJECTION, SOLUTION INTRAMUSCULAR; INTRAVENOUS at 12:16

## 2025-07-15 RX ADMIN — DEXAMETHASONE SODIUM PHOSPHATE 4 MG: 4 INJECTION, SOLUTION INTRA-ARTICULAR; INTRALESIONAL; INTRAMUSCULAR; INTRAVENOUS; SOFT TISSUE at 11:56

## 2025-07-15 RX ADMIN — PROPOFOL 100 MG: 10 INJECTION, EMULSION INTRAVENOUS at 11:56

## 2025-07-15 RX ADMIN — ACETAMINOPHEN 975 MG: 325 TABLET ORAL at 10:07

## 2025-07-15 RX ADMIN — OXYCODONE HYDROCHLORIDE 5 MG: 5 TABLET ORAL at 13:09

## 2025-07-15 RX ADMIN — Medication 100 MCG: at 12:05

## 2025-07-15 RX ADMIN — Medication 150 MCG: at 12:21

## 2025-07-15 RX ADMIN — Medication 150 MCG: at 12:31

## 2025-07-15 RX ADMIN — GLYCOPYRROLATE 0.2 MG: 0.2 INJECTION, SOLUTION INTRAMUSCULAR; INTRAVENOUS at 12:49

## 2025-07-15 RX ADMIN — FENTANYL CITRATE 50 MCG: 50 INJECTION, SOLUTION INTRAMUSCULAR; INTRAVENOUS at 11:56

## 2025-07-15 NOTE — DISCHARGE INSTRUCTIONS
[facility]  Home Care Following Anesthesia  For 24 hours after surgery:  Get plenty of rest.  A responsible adult must stay with you for at least 24 hours after you leave the surgery center.  Do not drive or use heavy equipment.  If you have weakness or tingling, don't drive or use heavy equipment until this feeling goes away.   Do not drink alcohol.   Avoid strenuous or risky activities.  Ask for help when climbing stairs.  You may feel lightheaded.  IF so, sit for a few minutes before standing.  Have someone help you get up.   If you have nausea (feel sick to your stomach): Drink only clear liquids such as apple juice, ginger ale, broth or 7-Up.  Rest may also help.  Be sure to drink enough fluids.  Move to a regular diet as you feel able.   You may have a slight fever.  Call the doctor if your fever is over 100 F (37.7 C) (taken under the tongue) or lasts longer than 24 hours.  You may have a dry mouth, a sore throat, muscle aches or trouble sleeping. These should go away after 24 hours.  Do not make important or legal decisions.   It is recommended to avoid smoking.               Tips for taking pain medications  To get the best pain relief possible, remember these points:  Take pain medications as directed, before pain becomes severe.  Pain medication can upset your stomach: taking it with food may help.  Constipation is a common side effect of pain medication. Drink plenty of  fluids.  Eat foods high in fiber. Take a stool softener if recommended by your doctor or pharmacist.  Do not drink alcohol, drive or operate machinery while taking pain medications.  Ask about other ways to control pain, such as with heat, ice or relaxation.    Tylenol/Acetaminophen Consumption    If you feel your pain relief is insufficient, you may take Tylenol/Acetaminophen in addition to your narcotic pain medication.   Be careful not to exceed 4,000 mg of Tylenol/Acetaminophen in a 24 hour  period from all sources.  If you are taking extra strength Tylenol/acetaminophen (500 mg), the maximum dose is 8 tablets in 24 hours.  If you are taking regular strength acetaminophen (325 mg), the maximum dose is 12 tablets in 24 hours.    Call a doctor for any of the following:  Signs of infection (fever, growing tenderness at the surgery site, a large amount of drainage or bleeding, severe pain, foul-smelling drainage, redness, swelling).  It has been over 8 to 10 hours since surgery and you are still not able to urinate (pass water).  Headache for over 24 hours.  Numbness, tingling or weakness the day after surgery (if you had spinal anesthesia).  Signs of Covid-19 infection (temperature over 100 degrees, shortness of breath, cough, loss of taste/smell, generalized body aches, persistent headache, chills, sore throat, nausea/vomiting/diarrhea)    Your doctor is:       Dr. Sin Clark, Orthopaedics: [phone]               After hours and weekends call the hospital @ [phone] and ask for the resident on call for:  Orthopaedics  For emergency care, call the:  [facility] [place] [place] Emergency Department: [phone] (TTY for hearing impaired: [phone])

## 2025-07-15 NOTE — TELEPHONE ENCOUNTER
Please let Edward know that I did the peer to peer review today with his insurance company and was able to get authorization for him to do the CT coronary calcium scan.  Please let him know that he should call to get this scheduled as soon as possible.  I think the authorization will only be good for about 1 month.    The authorization number is [id number].    Thank you,  DE

## 2025-07-15 NOTE — TELEPHONE ENCOUNTER
Called patient.  Relayed message from provider (see below).  Pt is already scheduled for scan on 8/12  All questions were answered.     Bharati PURDY RN

## 2025-07-15 NOTE — ANESTHESIA CARE TRANSFER NOTE
Patient: Isacc Louis    Procedure: Procedure(s):  Examination under anesthesia, knee arthroscopy, meniscectomy       Diagnosis: Chronic pain of right knee [M25.561, G89.29]  Diagnosis Additional Information: No value filed.    Anesthesia Type:   MAC     Note:    Oropharynx: oropharynx clear of all foreign objects and spontaneously breathing  Level of Consciousness: awake  Oxygen Supplementation: blow-by O2  Level of Supplemental Oxygen (L/min / FiO2): 6  Independent Airway: airway patency satisfactory and stable  Dentition: dentition unchanged  Vital Signs Stable: post-procedure vital signs reviewed and stable  Report to RN Given: handoff report given  Patient transferred to: Phase II    Handoff Report: Identifed the Patient, Identified the Reponsible Provider, Reviewed the pertinent medical history, Discussed the surgical course, Reviewed Intra-OP anesthesia mangement and issues during anesthesia, Set expectations for post-procedure period and Allowed opportunity for questions and acknowledgement of understanding    Vitals:  Vitals Value Taken Time   /77 07/15/25 12:57   Temp 36  C (96.8  F) 07/15/25 12:59   Pulse 76 07/15/25 12:56   Resp 16 07/15/25 12:57   SpO2 93 % 07/15/25 13:00   Vitals shown include unfiled device data.    Electronically Signed By: NIDA Rebollar CRNA  July 15, 2025  1:01 PM

## 2025-07-15 NOTE — H&P
"I have reviewed the surgical (or preoperative) H&P that is linked to this encounter, and examined the patient. There are no significant changes    Clinical Conditions Present on Arrival:  Clinically Significant Risk Factors Present on Admission                       # Overweight: Estimated body mass index is 25.46 kg/m  as calculated from the following:    Height as of this encounter: 1.715 m (5' 7.5\").    Weight as of this encounter: 74.8 kg (165 lb).       "

## 2025-07-15 NOTE — ANESTHESIA POSTPROCEDURE EVALUATION
Patient: Isacc Louis    Procedure: Procedure(s):  Examination under anesthesia, knee arthroscopy, meniscectomy       Anesthesia Type:  MAC    Note:  Disposition: Outpatient   Postop Pain Control: Uneventful            Sign Out: Well controlled pain   PONV: No   Neuro/Psych: Uneventful            Sign Out: Acceptable/Baseline neuro status   Airway/Respiratory: Uneventful            Sign Out: Acceptable/Baseline resp. status   CV/Hemodynamics: Uneventful            Sign Out: Acceptable CV status; No obvious hypovolemia; No obvious fluid overload   Other NRE: NONE   DID A NON-ROUTINE EVENT OCCUR? No           Last vitals:  Vitals Value Taken Time   /87 07/15/25 14:00   Temp 36.1  C (96.9  F) 07/15/25 14:00   Pulse 65 07/15/25 14:00   Resp 16 07/15/25 14:00   SpO2 96 % 07/15/25 14:00       Electronically Signed By: Skyler Bates MD  July 15, 2025  4:35 PM

## 2025-07-22 ENCOUNTER — OFFICE VISIT (OUTPATIENT)
Dept: ORTHOPEDICS | Facility: CLINIC | Age: 65
End: 2025-07-22
Payer: COMMERCIAL

## 2025-07-22 DIAGNOSIS — Z48.89 ENCOUNTER FOR POSTOPERATIVE CARE: Primary | ICD-10-CM

## 2025-07-22 PROCEDURE — 99024 POSTOP FOLLOW-UP VISIT: CPT

## 2025-07-22 NOTE — LETTER
7/22/2025      Isacc Louis  [address] ([place] [id number]      Dear Colleague,    Thank you for referring your patient, Isacc Louis, to the [facility] [place]. Please see a copy of my visit note below.    Chief Complaint:   Follow up, DOS 7/15/25 with Dr. Clark    Procedures:  Examination under anesthesia, right knee   Right  knee arthroscopy, partial medial meniscectomy     History:  Isacc Louis is a 64 year old patient status post above procedure, here for follow-up.  He has done very well since surgery.  Pain is controlled without opioid pain medication, taking Tylenol as needed.  He is advancing activity on his own, he did preop physical therapy and has a home exercise program.  Does endorse anterior knee soreness with increased activity, otherwise pain is well-controlled.  No specific questions or concerns today.      Exam:     General: Awake, Alert, and oriented. Articulates and communicates with a normal affect     Right lower Extremity:  Right incisions well healed without evidence of infection, no erythema, drainage, or wound dehiscence   Normal post-operative effusion and ecchymosis  Range of motion and stability exam not performed  TA/Gsc/EHL/FHL with 5/5 strength  Distal pulses palpable, toes are warm and well perfused   Gait: Near symmetric without assistive device    Imaging:  No new imaging    Medications:     Current Outpatient Medications:      ibuprofen (ADVIL/MOTRIN) 200 MG tablet, Take 200 mg by mouth every 4 hours as needed for mild pain, Disp: , Rfl:      losartan (COZAAR) 100 MG tablet, Take 1 tablet (100 mg) by mouth daily., Disp: 90 tablet, Rfl: 3    Assessment:  Doing well 1 week s/p right knee arthroscopy, partial medial meniscectomy with Dr. Clark. Basic wound cares were performed today.  Nylon sutures were removed, I did not appreciate signs of infection. We discussed the plan below, all questions were answered to the  best of my ability.     Plan:   -Weight bearing: WBAT   -Range of motion as tolerated   -DVT prophylaxis: ASA 81 mg BID x 4 weeks  -Follow up: 6 weeks with Dr. Clark, no XR necessary    Lena Ha PA-C 7/9/2025 12:42 PM  Orthopedic Surgery      Again, thank you for allowing me to participate in the care of your patient.        Sincerely,        Lena Ha PA-C    Electronically signed

## 2025-07-22 NOTE — NURSING NOTE
Reason For Visit:   Chief Complaint   Patient presents with    Surgical Followup     1 week POP Right knee scope/meniscectomy  // DOS 7/15/25 with Dr. Clark        There were no vitals taken for this visit.    Pain Assessment  Patient Currently in Pain: Yes (feels some tenderness when walking)  Primary Pain Location: Knee (Right)    Marnie Hernandez ATC

## 2025-08-12 ENCOUNTER — HOSPITAL ENCOUNTER (OUTPATIENT)
Dept: CARDIOLOGY | Facility: CLINIC | Age: 65
Discharge: HOME | End: 2025-08-12
Attending: FAMILY MEDICINE
Payer: COMMERCIAL

## 2025-08-12 ENCOUNTER — RESULTS FOLLOW-UP (OUTPATIENT)
Dept: FAMILY MEDICINE | Facility: CLINIC | Age: 65
End: 2025-08-12

## 2025-08-12 DIAGNOSIS — R93.89 ABNORMAL CHEST CT: ICD-10-CM

## 2025-08-12 DIAGNOSIS — K76.9 LIVER LESION, LEFT LOBE: Primary | ICD-10-CM

## 2025-08-12 DIAGNOSIS — E78.00 PURE HYPERCHOLESTEROLEMIA: ICD-10-CM

## 2025-08-12 DIAGNOSIS — R91.8 PULMONARY NODULES: ICD-10-CM

## 2025-08-12 PROCEDURE — 75571 CT HRT W/O DYE W/CA TEST: CPT

## 2025-08-12 RX ORDER — ROSUVASTATIN CALCIUM 10 MG/1
10 TABLET, COATED ORAL DAILY
Qty: 90 TABLET | Refills: 3 | Status: SHIPPED | OUTPATIENT
Start: 2025-08-12

## 2025-09-03 ENCOUNTER — MYC MEDICAL ADVICE (OUTPATIENT)
Dept: FAMILY MEDICINE | Facility: CLINIC | Age: 65
End: 2025-09-03
Payer: COMMERCIAL

## (undated) DEVICE — Device

## (undated) DEVICE — PAD ARMBOARD FOAM EGGCRATE COVIDEN 3114367

## (undated) DEVICE — LINEN TOWEL PACK X5 5464

## (undated) DEVICE — LINEN DRAPE 54X72" 5467

## (undated) DEVICE — SU ETHILON 3-0 FS-1 18" 669H

## (undated) DEVICE — PREP CHLORAPREP 26ML TINTED HI-LITE ORANGE 930815

## (undated) DEVICE — NDL 25GA 1.5" 305127

## (undated) DEVICE — SUCTION MANIFOLD NEPTUNE 2 SYS 4 PORT 0702-020-000

## (undated) DEVICE — SU VICRYL 3-0 RB-1 27" UND J215H

## (undated) DEVICE — PREP CHLORAPREP 26ML TINTED ORANGE  260815

## (undated) DEVICE — BNDG KLING 3" 2232

## (undated) DEVICE — DRSG KERLIX 4 1/2"X4YDS ROLL 6715

## (undated) DEVICE — SOL NACL 0.9% IRRIG 3000ML BAG 2B7477

## (undated) DEVICE — SOL WATER IRRIG 1000ML BOTTLE 07139-09

## (undated) DEVICE — GOWN IMPERVIOUS BREATHABLE 2XL/XLONG

## (undated) DEVICE — DRAPE STERI TOWEL SM 1000

## (undated) DEVICE — DRSG GAUZE 4X4" TRAY 6939

## (undated) DEVICE — NDL 19GA 1.5"

## (undated) DEVICE — SYR 10ML LL W/O NDL

## (undated) DEVICE — DRSG ADAPTIC 3X8" 6113

## (undated) DEVICE — PACK ARTHROSCOPY CUSTOM ASC

## (undated) DEVICE — TUBING SYSTEM ARTHREX PATIENT REDEUCE AR-6421

## (undated) DEVICE — ESU PENCIL SMOKE EVAC W/ROCKER SWITCH 0703-047-000

## (undated) DEVICE — SU VICRYL 4-0 PS-2 18" UND J496H

## (undated) DEVICE — BNDG COBAN 1"X5YDS UNSTERILE

## (undated) DEVICE — GLOVE PROTEXIS BLUE W/NEU-THERA 8.0  2D73EB80

## (undated) DEVICE — GLOVE PROTEXIS W/NEU-THERA 8.5  2D73TE85

## (undated) DEVICE — PACK EXTREMITY SOP15EXFSD

## (undated) DEVICE — BUR ARTHREX COOLCUT SABRE 4.0MMX13CM AR-8400SR

## (undated) DEVICE — SU ETHILON 3-0 PS-1 18" 1663H

## (undated) RX ORDER — ACETAMINOPHEN 325 MG/1
TABLET ORAL
Status: DISPENSED
Start: 2025-07-15

## (undated) RX ORDER — PROPOFOL 10 MG/ML
INJECTION, EMULSION INTRAVENOUS
Status: DISPENSED
Start: 2021-04-27

## (undated) RX ORDER — FENTANYL CITRATE 50 UG/ML
INJECTION, SOLUTION INTRAMUSCULAR; INTRAVENOUS
Status: DISPENSED
Start: 2025-07-15

## (undated) RX ORDER — GLYCOPYRROLATE 0.2 MG/ML
INJECTION, SOLUTION INTRAMUSCULAR; INTRAVENOUS
Status: DISPENSED
Start: 2025-07-15

## (undated) RX ORDER — LIDOCAINE HYDROCHLORIDE 20 MG/ML
INJECTION, SOLUTION INFILTRATION; PERINEURAL
Status: DISPENSED
Start: 2021-04-27

## (undated) RX ORDER — OXYCODONE HYDROCHLORIDE 5 MG/1
TABLET ORAL
Status: DISPENSED
Start: 2025-07-15

## (undated) RX ORDER — ACETAMINOPHEN 325 MG/1
TABLET ORAL
Status: DISPENSED
Start: 2021-04-27

## (undated) RX ORDER — CEFAZOLIN SODIUM 2 G/50ML
SOLUTION INTRAVENOUS
Status: DISPENSED
Start: 2025-07-15

## (undated) RX ORDER — FENTANYL CITRATE 50 UG/ML
INJECTION, SOLUTION INTRAMUSCULAR; INTRAVENOUS
Status: DISPENSED
Start: 2021-04-27

## (undated) RX ORDER — KETOROLAC TROMETHAMINE 30 MG/ML
INJECTION, SOLUTION INTRAMUSCULAR; INTRAVENOUS
Status: DISPENSED
Start: 2021-04-27

## (undated) RX ORDER — HYDROMORPHONE HYDROCHLORIDE 1 MG/ML
INJECTION, SOLUTION INTRAMUSCULAR; INTRAVENOUS; SUBCUTANEOUS
Status: DISPENSED
Start: 2025-07-15

## (undated) RX ORDER — FENTANYL CITRATE-0.9 % NACL/PF 10 MCG/ML
PLASTIC BAG, INJECTION (ML) INTRAVENOUS
Status: DISPENSED
Start: 2025-07-15

## (undated) RX ORDER — ONDANSETRON 2 MG/ML
INJECTION INTRAMUSCULAR; INTRAVENOUS
Status: DISPENSED
Start: 2021-04-27